# Patient Record
Sex: FEMALE | Race: WHITE | NOT HISPANIC OR LATINO | Employment: FULL TIME | URBAN - METROPOLITAN AREA
[De-identification: names, ages, dates, MRNs, and addresses within clinical notes are randomized per-mention and may not be internally consistent; named-entity substitution may affect disease eponyms.]

---

## 2019-04-17 ENCOUNTER — APPOINTMENT (OUTPATIENT)
Dept: GENERAL RADIOLOGY | Facility: CLINIC | Age: 24
End: 2019-04-17
Attending: EMERGENCY MEDICINE
Payer: COMMERCIAL

## 2019-04-17 ENCOUNTER — HOSPITAL ENCOUNTER (EMERGENCY)
Facility: CLINIC | Age: 24
Discharge: HOME | End: 2019-04-17
Attending: EMERGENCY MEDICINE | Admitting: EMERGENCY MEDICINE
Payer: COMMERCIAL

## 2019-04-17 VITALS
SYSTOLIC BLOOD PRESSURE: 129 MMHG | HEART RATE: 118 BPM | HEIGHT: 63 IN | RESPIRATION RATE: 15 BRPM | BODY MASS INDEX: 40.4 KG/M2 | TEMPERATURE: 99.7 F | WEIGHT: 228 LBS | OXYGEN SATURATION: 99 % | DIASTOLIC BLOOD PRESSURE: 88 MMHG

## 2019-04-17 DIAGNOSIS — R06.02 SHORTNESS OF BREATH: ICD-10-CM

## 2019-04-17 LAB
ALBUMIN UR-MCNC: NEGATIVE MG/DL
ANION GAP SERPL CALCULATED.3IONS-SCNC: 9 MMOL/L (ref 3–14)
APPEARANCE UR: CLEAR
BASOPHILS # BLD AUTO: 0 10E9/L (ref 0–0.2)
BASOPHILS NFR BLD AUTO: 0.3 %
BILIRUB UR QL STRIP: NEGATIVE
BUN SERPL-MCNC: 13 MG/DL (ref 7–30)
CALCIUM SERPL-MCNC: 8.8 MG/DL (ref 8.5–10.1)
CHLORIDE SERPL-SCNC: 108 MMOL/L (ref 94–109)
CO2 SERPL-SCNC: 24 MMOL/L (ref 20–32)
COLOR UR AUTO: NORMAL
CREAT SERPL-MCNC: 0.58 MG/DL (ref 0.52–1.04)
D DIMER PPP FEU-MCNC: 0.4 UG/ML FEU (ref 0–0.5)
DIFFERENTIAL METHOD BLD: NORMAL
EOSINOPHIL # BLD AUTO: 0 10E9/L (ref 0–0.7)
EOSINOPHIL NFR BLD AUTO: 0.3 %
ERYTHROCYTE [DISTWIDTH] IN BLOOD BY AUTOMATED COUNT: 12 % (ref 10–15)
GFR SERPL CREATININE-BSD FRML MDRD: >90 ML/MIN/{1.73_M2}
GLUCOSE SERPL-MCNC: 94 MG/DL (ref 70–99)
GLUCOSE UR STRIP-MCNC: NEGATIVE MG/DL
HCG UR QL: NEGATIVE
HCT VFR BLD AUTO: 41.6 % (ref 35–47)
HGB BLD-MCNC: 14.1 G/DL (ref 11.7–15.7)
HGB UR QL STRIP: NEGATIVE
IMM GRANULOCYTES # BLD: 0 10E9/L (ref 0–0.4)
IMM GRANULOCYTES NFR BLD: 0.1 %
KETONES UR STRIP-MCNC: NEGATIVE MG/DL
LEUKOCYTE ESTERASE UR QL STRIP: NEGATIVE
LYMPHOCYTES # BLD AUTO: 2.8 10E9/L (ref 0.8–5.3)
LYMPHOCYTES NFR BLD AUTO: 31.5 %
MAGNESIUM SERPL-MCNC: 2.1 MG/DL (ref 1.6–2.3)
MCH RBC QN AUTO: 28.7 PG (ref 26.5–33)
MCHC RBC AUTO-ENTMCNC: 33.9 G/DL (ref 31.5–36.5)
MCV RBC AUTO: 85 FL (ref 78–100)
MONOCYTES # BLD AUTO: 0.8 10E9/L (ref 0–1.3)
MONOCYTES NFR BLD AUTO: 8.7 %
NEUTROPHILS # BLD AUTO: 5.2 10E9/L (ref 1.6–8.3)
NEUTROPHILS NFR BLD AUTO: 59.1 %
NITRATE UR QL: NEGATIVE
NRBC # BLD AUTO: 0 10*3/UL
NRBC BLD AUTO-RTO: 0 /100
PH UR STRIP: 7 PH (ref 5–7)
PLATELET # BLD AUTO: 335 10E9/L (ref 150–450)
POTASSIUM SERPL-SCNC: 3.2 MMOL/L (ref 3.4–5.3)
RBC # BLD AUTO: 4.92 10E12/L (ref 3.8–5.2)
SODIUM SERPL-SCNC: 141 MMOL/L (ref 133–144)
SOURCE: NORMAL
SP GR UR STRIP: 1 (ref 1–1.03)
TSH SERPL DL<=0.005 MIU/L-ACNC: 2.7 MU/L (ref 0.4–4)
UROBILINOGEN UR STRIP-MCNC: NORMAL MG/DL (ref 0–2)
WBC # BLD AUTO: 8.8 10E9/L (ref 4–11)

## 2019-04-17 PROCEDURE — 83735 ASSAY OF MAGNESIUM: CPT | Performed by: EMERGENCY MEDICINE

## 2019-04-17 PROCEDURE — 81003 URINALYSIS AUTO W/O SCOPE: CPT | Performed by: EMERGENCY MEDICINE

## 2019-04-17 PROCEDURE — 25000128 H RX IP 250 OP 636: Performed by: EMERGENCY MEDICINE

## 2019-04-17 PROCEDURE — 96361 HYDRATE IV INFUSION ADD-ON: CPT

## 2019-04-17 PROCEDURE — 85025 COMPLETE CBC W/AUTO DIFF WBC: CPT | Performed by: EMERGENCY MEDICINE

## 2019-04-17 PROCEDURE — 84443 ASSAY THYROID STIM HORMONE: CPT | Performed by: EMERGENCY MEDICINE

## 2019-04-17 PROCEDURE — 80048 BASIC METABOLIC PNL TOTAL CA: CPT | Performed by: EMERGENCY MEDICINE

## 2019-04-17 PROCEDURE — 71046 X-RAY EXAM CHEST 2 VIEWS: CPT

## 2019-04-17 PROCEDURE — 96360 HYDRATION IV INFUSION INIT: CPT

## 2019-04-17 PROCEDURE — 93005 ELECTROCARDIOGRAM TRACING: CPT

## 2019-04-17 PROCEDURE — 99285 EMERGENCY DEPT VISIT HI MDM: CPT | Mod: 25

## 2019-04-17 PROCEDURE — 85379 FIBRIN DEGRADATION QUANT: CPT | Performed by: EMERGENCY MEDICINE

## 2019-04-17 PROCEDURE — 81025 URINE PREGNANCY TEST: CPT | Performed by: EMERGENCY MEDICINE

## 2019-04-17 RX ADMIN — SODIUM CHLORIDE 1000 ML: 9 INJECTION, SOLUTION INTRAVENOUS at 13:35

## 2019-04-17 ASSESSMENT — MIFFLIN-ST. JEOR: SCORE: 1753.33

## 2019-04-17 ASSESSMENT — ENCOUNTER SYMPTOMS
SORE THROAT: 1
FEVER: 0
SHORTNESS OF BREATH: 1

## 2019-04-17 NOTE — ED AVS SNAPSHOT
Emergency Department  [address] [place] [address]  Phone:  [phone]  Fax:  [phone]                                    Naomi Morales   MRN: [id number]    Department:   Emergency Department   Date of Visit:  4/17/2019           After Visit Summary Signature Page    I have received my discharge instructions, and my questions have been answered. I have discussed any challenges I see with this plan with the nurse or doctor.    ..........................................................................................................................................  Patient/Patient Representative Signature      ..........................................................................................................................................  Patient Representative Print Name and Relationship to Patient    ..................................................               ................................................  Date                                   Time    ..........................................................................................................................................  Reviewed by Signature/Title    ...................................................              ..............................................  Date                                               Time          22EPIC Rev 08/18

## 2019-04-17 NOTE — ED PROVIDER NOTES
"  History     Chief Complaint:  Shortness of breath    HPI:   The history is provided by the patient.      Naomi Morales is a 23 year old female with history of sinus tachycardia, asthma, and anxiety who presents for evaluation of shortness of breath. The patient was diagnosed with pneumonia 2 weeks ago and started on antibiotics. The patient states that the shortness of breath that she had with this pneumonia had improved over the past 2 weeks but never completely went away. Today, the patient states that she woke up with a \"thick tongue\" sensation and mild sore throat with increased shortness of breath. She states she used her home inhaler, but this did not help much. The patient states that she was concerned for possible allergic reaction, prompting her evaluation. She denies fevers, fainting, or rash. No chest pain or pleuritic symptoms. The patient notes that her calves bilaterally feel sore with some ankle swelling. She has a Nexaplanon implant for birth control. No recent travel, surgery, or history of blood clots. She denies chest pain with inspiration. No rash or hives. No voice changes. No stridor.    Allergies:  No known drug allergies      Medications:    Flonase  Nexaplanon   Buspar  Cymbalta  Fluconazole     Past Medical History:    Anxiety  Depressive disorder  Fibromyalgia   Mild intermittent asthma   Vitamin D deficiency  Sinus tachycardia     Past Surgical History:    ACL reconstruction     Family History:    Cataract: father  Thyroid disorder: mother    Social History:  PCP: [facility]    Marital Status:  Single  Smoking status: never  Alcohol use: yes, 1x per month      Review of Systems   Constitutional: Negative for fever.   HENT: Positive for sore throat.    Respiratory: Positive for shortness of breath.    Cardiovascular: Positive for leg swelling. Negative for chest pain.   Skin: Negative for rash.   Neurological: Negative for syncope.   All other systems reviewed and " "are negative.    Physical Exam     Patient Vitals for the past 24 hrs:   BP Temp Temp src Pulse Heart Rate Resp SpO2 Height Weight   04/17/19 1652 129/88 -- -- 118 -- -- -- -- --   04/17/19 1651 -- -- -- -- -- -- 99 % -- --   04/17/19 1500 (!) 143/94 -- -- 112 112 15 99 % -- --   04/17/19 1430 122/86 -- -- 111 105 19 100 % -- --   04/17/19 1400 141/83 -- -- 116 117 19 98 % -- --   04/17/19 1330 -- -- -- -- 141 13 100 % -- --   04/17/19 1313 156/90 99.7  F (37.6  C) Tympanic 152 152 20 100 % 1.6 m (5' 3\") 103.4 kg (228 lb)        Physical Exam  General: Well appearing, nontoxic.  Resting comfortably. Talking on cell phone.  Head:  Scalp, face, and head appear normal  Eyes:  Pupils are equal, round, and reactive to light    Conjunctivae non-injected and sclerae white  ENT:    The external nose is normal    Pinnae are normal    The oropharynx is normal, mucous membranes moist    Posterior pharynx clear without swelling, exudates or erythema. No mucosal lesions, tongue or lip swelling. No tongue elevation. Uvula normal without deviation. Voice normal. No drooling or trismus.     Uvula is in the midline  Neck:  Normal range of motion    There is no rigidity noted    Trachea is in the midline  CV:  Tachycardic rate, regular rhythm     Normal S1/S2, no S3/S4    No murmur or rub. Radial pulses 2+ bilaterally   Resp:  Lungs are clear and equal bilaterally    There is no tachypnea    No increased work of breathing    No rales, wheezing, or rhonchi  GI:  Abdomen is soft, no rigidity or guarding    No distension, or mass    No tenderness or rebound tenderness   MS:  Normal muscular tone    Symmetric motor strength    No lower extremity edema. No calf swelling or tenderness.  Skin:  No rash or acute skin lesions noted  Neuro: Awake and alert. Oriented x3    CN II-XII intact. Strength 5/5 and symmetric.    Speech is normal and fluent    Moves all extremities spontaneously  Psych:  Normal affect.  Appropriate " interactions.      Emergency Department Course     ECG (13:32:40):  Rate 135 bpm. OH interval 138. QRS duration 76. QT/QTc 300/450. P-R-T axes 60 64 37.   Sinus tachycardia   Right atrial enlargement  Borderline ECG  Interpreted at 1340 by Silver Simpson MD.     Imaging:  Radiographic findings were communicated with the patient who voiced understanding of the findings.    XR Chest 2 Views  Impression: No acute disease.  As read by Radiology.     Laboratory:  UA reflex to microscopic and culture: WNL  HCG qualitative urine: Negative  CBC: WNL (WBC 8.8, HGB 14.1, )   BMP: potassium 3.2, o/w WNL (Creatinine 0.58)  D dimer: 0.4  Magnesium: 2.1  TSH with free T4 reflex: 2.70    Interventions:  1335: NS 1L IV Bolus      Emergency Department Course:  Past medical records, nursing notes, and vitals reviewed.  1340: I performed an exam of the patient and obtained history, as documented above.  IV started and blood drawn for laboratory testing. Results are as above.    The patient was sent for X-ray imaging while in the emergency department, findings above.       1600: I rechecked the patient. Explained findings to the patient.     I rechecked the patient. Findings and plan explained to the Patient. Patient discharged home with instructions regarding supportive care, medications, and reasons to return. The importance of close follow-up was reviewed.      Impression & Plan      Medical Decision Making:  Naomi Morales is a 23 year old female who presents with multiple complaints, but chiefly she is concerned she may have an allergic reaction to something. She was recently treated for pneumonia with an unknown antibiotic, which is now finished. She has not had any recurrent or persistent fevers or cough. She notes some mild ongoing shortness of breath but no chest pain and no pleuritic pain. She also notes swelling to the bilateral lower extremities and calf soreness. On my evaluation, the patient is well-appearing.  She is tachycardic but afebrile. No hypoxia. Her blood pressure is reassuring. EKG was obtained and reveals sinus tachycardia. The patinet appears clinically mildly dehydrated. A broad differential diagnosis is considered including dysrhythmia, PE, DVT, dehydration, underlying metabolic disturbance or infection. Lung exam is clear. She has no increased work of breathing or respiratory distress. ED workup was reassuring. Her signs and symptoms are not consistent with allergic reaction. Chest X-ray was negative for any pneumonia, pleural effusions, or other acute thoracic findings. ECG was negative. BMP and CBC are completely unremarkable. TSH is normal. Urinalysis is negative for infection. D dimer is normal which makes PE or DVT very unlikely. The patient was treated with IV fluids and her tachycardia improved. She also admits to being very anxious, which likely contributed to her tachycardia. The patient felt significantly improved after IV fluids. I recommended ongoing close outpatient follow up. At this time, there does not appear to be any severe acute life threatening or underlying illness. Close return precautions were provided and she was discharged in stable and improved condition.     Critical Care time:  none    Diagnosis:    ICD-10-CM    1. Shortness of breath R06.02        Disposition:  discharged to home    Discharge Medications: There are no discharge medications for this visit.    I, Megan Beh, am serving as a scribe at 1:40 PM on 4/17/2019 to document services personally performed by Silver Simpson MD based on my observations and the provider's statements to me.      Megan Beh  4/17/2019    EMERGENCY DEPARTMENT       Silver Simpson MD  04/17/19 [id number]

## 2019-04-18 LAB — INTERPRETATION ECG - MUSE: NORMAL

## 2019-04-28 ENCOUNTER — APPOINTMENT (OUTPATIENT)
Dept: GENERAL RADIOLOGY | Facility: CLINIC | Age: 24
End: 2019-04-28
Attending: PHYSICIAN ASSISTANT
Payer: OTHER MISCELLANEOUS

## 2019-04-28 ENCOUNTER — HOSPITAL ENCOUNTER (EMERGENCY)
Facility: CLINIC | Age: 24
Discharge: HOME | End: 2019-04-28
Attending: PHYSICIAN ASSISTANT | Admitting: PHYSICIAN ASSISTANT
Payer: OTHER MISCELLANEOUS

## 2019-04-28 VITALS
DIASTOLIC BLOOD PRESSURE: 94 MMHG | TEMPERATURE: 98.1 F | WEIGHT: 230 LBS | OXYGEN SATURATION: 100 % | BODY MASS INDEX: 40.75 KG/M2 | RESPIRATION RATE: 16 BRPM | HEIGHT: 63 IN | HEART RATE: 110 BPM | SYSTOLIC BLOOD PRESSURE: 151 MMHG

## 2019-04-28 DIAGNOSIS — S82.832A CLOSED FRACTURE OF DISTAL END OF LEFT FIBULA, UNSPECIFIED FRACTURE MORPHOLOGY, INITIAL ENCOUNTER: ICD-10-CM

## 2019-04-28 PROCEDURE — 27786 TREATMENT OF ANKLE FRACTURE: CPT | Mod: LT

## 2019-04-28 PROCEDURE — 99284 EMERGENCY DEPT VISIT MOD MDM: CPT | Mod: 25

## 2019-04-28 PROCEDURE — 73610 X-RAY EXAM OF ANKLE: CPT | Mod: LT

## 2019-04-28 ASSESSMENT — ENCOUNTER SYMPTOMS
JOINT SWELLING: 1
HEADACHES: 0
MYALGIAS: 1
ARTHRALGIAS: 1

## 2019-04-28 ASSESSMENT — MIFFLIN-ST. JEOR: SCORE: 1762.4

## 2019-04-28 NOTE — LETTER
April 28, 2019      To Whom It May Concern:      Naomi Morales was seen in our Emergency Department today, 04/28/19. Please limit weight bearing activities until orthopedic follow up. I expect her condition to improve over the next 5-7 days.        Sincerely,        Sindi Yuen PA-C

## 2019-04-28 NOTE — ED PROVIDER NOTES
"  History     Chief Complaint:  Ankle pain    HPI   Naomi Morales is a 23 year old female, otherwise healthy, who presents to the emergency department for evaluation of left ankle pain. 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 She reports hearing a \"crack\". She reports pain to her lateral ankle. She reports she was unable to get up on her own and has been unable to bare weight on her ankle. She denies hitting her head or any loss of consciousness. She denies any other injuries in the fall.  She denies weakness or numbness.  She did note some tingling to the big toe, but this is now resolved.    Allergies:  No known drug allergies     Medications:    Nexplanon  Buspar  Duloxetine  Zantac  Vitamin D  Flonase  CBD oil    Past Medical History:    Anxiety  Depression  Varicella    Past Surgical History:    ACL reconstruction  I & D    Family History:    Diabetes    Social History:  Smoking status: Passive smoke exposure, never smoker.  Alcohol use: No  The patient presents to the emergency department with her boyfriend.  Marital Status:  Single [1]     Review of Systems   Musculoskeletal: Positive for arthralgias, joint swelling and myalgias.   Neurological: Negative for syncope and headaches.   All other systems reviewed and are negative.    Physical Exam   Patient Vitals for the past 24 hrs:   BP Temp Temp src Pulse Resp SpO2 Height Weight   04/28/19 1548 (!) 151/94 98.1  F (36.7  C) Oral 110 16 100 % 1.6 m (5' 3\") 104.3 kg (230 lb)     Physical Exam  General: Resting comfortably. Alert and oriented.   Head:  The scalp, face, and head appear normal   Eyes:  Conjunctivae and sclerae are normal    CV:  DP and PT pulses intact to left foot    Capillary refill is brisk in all digits of the left foot.   Resp:  No tachypnea. No respiratory distress    Normal effort  MS:  Tenderness to left lateral ankle. ROM of the ankle is full, but painful. Patient can " wiggle toes without difficulty. No proximal fibular tenderness. Knee ROM is normal. Achilles is clinically intact.  Skin:  No ecchymosis. No obvious deformity. Swelling to the lateral aspect of the left ankle. No lacerations or abrasions.  Neuro: Sensation is intact throughout left foot.     Emergency Department Course   Imaging:  Radiographic findings were communicated with the patient and family who voiced understanding of the findings.    XR Ankle Left G/E 3 Views  Addendum: There is a subtle nondisplaced fracture through the distal  fibula.   As read by Radiology.    Emergency Department Course:  Past medical records, nursing notes, and vitals reviewed.  1559: I performed an exam of the patient and obtained history, as documented above.    The patient was sent for an ankle x-ray while in the emergency department, findings above.     1700: I rechecked the patient. Explained findings to the patient and her boyfriend.    1709: I discussed the patient with my partner, Dr. Larkin.    Patient was provided a CAM boot and crutches.    1724: I rechecked the patient. Findings and plan explained to the Patient and significant other. Patient discharged home with instructions regarding supportive care, medications, and reasons to return. The importance of close follow-up was reviewed.   Impression & Plan    Medical Decision Making:  Naomi Morales is a 23 year old female who presents for evaluation of a left ankle injury. CMS is intact distally in the extremity.  Pulses are normal and there are no signs of serious sequelae including compartment syndrome of the leg or foot. They did not hit their head and did not sustain any injury to suggest need for further workup at this time.     X-rays reveals a distal fibular fracture that does not need reduction at this time. They understand that this need for reduction and/or surgery may change with time and orthopedic consultation. There is no indication for ortho consultation  from the ED. Rather, close follow-up is indicated in the next days. They will be discharged home. A CAM boot was placed and the patient was given crutches. They will assisted weight bearing. Fracture precautions were given for home. They were asked to follow up with orthopedics in 2-3 days for recheck. They were asked to return immediately for increased pain, weakness, numbness, change in color or the extremity, fevers, or any other concerns. All questions were answered prior to discharge. The patient understands and agrees to this plan.        Diagnosis:    ICD-10-CM   1. Closed fracture of distal end of left fibula, unspecified fracture morphology, initial encounter S82.832A     Disposition:  Discharged to home with instructions for follow up.  Andreea Delgado  4/28/2019    EMERGENCY DEPARTMENT  Scribe Disclosure:  I, Andreea Delgado, am serving as a scribe at 3:59 PM on 4/28/2019 to document services personally performed by Sindi Yuen PA-C based on my observations and the provider's statements to me.      Sindi Yuen PA-C  04/28/19 9650

## 2019-04-28 NOTE — ED NOTES
"Emergency Department Attending Supervision Note  4/28/2019  5:40 PM      I evaluated this patient in conjunction with Sindi Yuen PA       Briefly, the patient presented with left ankle pain s/p mechanical fall. 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 She reports hearing a \"crack\". She reports pain to her lateral ankle. She reports she was unable to get up on her own and has been unable to bare weight on her ankle. She denies hitting her head or any loss of consciousness. She denies any other injuries in the fall.  She denies weakness or numbness.  She did note some tingling to the big toe, but this is now resolved.    On my exam:  BP (!) 151/94   Pulse 110   Temp 98.1  F (36.7  C) (Oral)   Resp 16   Ht 1.6 m (5' 3\")   Wt 104.3 kg (230 lb)   SpO2 100%   BMI 40.74 kg/m        General: Patient in mild to moderate distress.  Alert and cooperative with exam. Normal mentation  HEENT: NC/AT. Conjunctiva without injection or scleral icterus. External ears normal.  Respiratory: Breathing comfortably on room air  CV: Normal rate, all extremities well perfused  GI:  Non-distended abdomen  Skin: Warm, dry, no rashes/open wounds on exposed skin  Musculoskeletal: LLE: CMS intact. Swelling and tenderness to palpation of the distal fibula. No pain to palpation at the knees; knee and hip exam are normal. No tenderness to pation of mid foot.   Neuro: Alert, answers questions appropriately. No gross motor deficits      Results:  XR Ankle Left G/E 3 Views   Final Result   Addendum 1 of 1   ASTRID ESPINOZA   Accession # [id number]      The original report on this patient was dictated by myself.        Addendum: There is a subtle nondisplaced fracture through the distal   fibula.      HENOK OSCAR MD (Date of Addendum: 4/28/2019 4:49 PM)      HENOK OSCAR MD      Final          My impression is closed and nondisplaced fracture through the distal fibula " from mechanical fall.  No other significant injuries identified.  See imaging findings above.  Patient was provided walking boot and crutches.  Recommended Tylenol/ibuprofen for pain control as well as icing.  Recommended close follow-up with [facility] orthopedics.  Return precautions discussed.  Patient discharged home.    Diagnosis    ICD-10-CM    1. Closed fracture of distal end of left fibula, unspecified fracture morphology, initial encounter S82.832A        Rito Larkin DO,       Rito Larkin DO  04/29/19 0953

## 2019-04-28 NOTE — ED AVS SNAPSHOT
Emergency Department  [address] [place] [address]  Phone:  [phone]  Fax:  [phone]                                    Naomi Morales   MRN: [id number]    Department:   Emergency Department   Date of Visit:  4/28/2019           After Visit Summary Signature Page    I have received my discharge instructions, and my questions have been answered. I have discussed any challenges I see with this plan with the nurse or doctor.    ..........................................................................................................................................  Patient/Patient Representative Signature      ..........................................................................................................................................  Patient Representative Print Name and Relationship to Patient    ..................................................               ................................................  Date                                   Time    ..........................................................................................................................................  Reviewed by Signature/Title    ...................................................              ..............................................  Date                                               Time          22EPIC Rev 08/18

## 2019-12-01 ENCOUNTER — OFFICE VISIT (OUTPATIENT)
Dept: URGENT CARE | Facility: URGENT CARE | Age: 24
End: 2019-12-01
Payer: COMMERCIAL

## 2019-12-01 VITALS
OXYGEN SATURATION: 97 % | HEART RATE: 107 BPM | DIASTOLIC BLOOD PRESSURE: 96 MMHG | BODY MASS INDEX: 37.21 KG/M2 | WEIGHT: 210 LBS | HEIGHT: 63 IN | TEMPERATURE: 98.1 F | SYSTOLIC BLOOD PRESSURE: 146 MMHG

## 2019-12-01 DIAGNOSIS — S61.411A LACERATION OF RIGHT PALM, INITIAL ENCOUNTER: Primary | ICD-10-CM

## 2019-12-01 PROCEDURE — 12001 RPR S/N/AX/GEN/TRNK 2.5CM/<: CPT | Performed by: PHYSICIAN ASSISTANT

## 2019-12-01 RX ORDER — CHOLECALCIFEROL (VITAMIN D3) 50 MCG
1 TABLET ORAL DAILY
COMMUNITY
End: 2021-12-31

## 2019-12-01 RX ORDER — DULOXETIN HYDROCHLORIDE 30 MG/1
30 CAPSULE, DELAYED RELEASE ORAL 2 TIMES DAILY
COMMUNITY
End: 2021-12-31

## 2019-12-01 RX ORDER — BUSPIRONE HYDROCHLORIDE 10 MG/1
10 TABLET ORAL DAILY
Status: ON HOLD | COMMUNITY
End: 2022-07-09

## 2019-12-01 ASSESSMENT — MIFFLIN-ST. JEOR: SCORE: 1662.71
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
## 2019-12-11 ENCOUNTER — NURSE TRIAGE (OUTPATIENT)
Dept: NURSING | Facility: CLINIC | Age: 24
End: 2019-12-11

## 2019-12-11 NOTE — TELEPHONE ENCOUNTER
FNA triage call :   Presenting problem :  Had  2 stitichs  In R palm on 12/1/19 , and need stitches removed after 10 days and healing well and no fever . Requesting appt only for stitches removed and sent to .    Caller verbalizes understanding and denies further questions and will call back if further symptoms to triage or questions  . Clarita Means RN  - [place] Nurse Advisor

## 2020-02-08 ENCOUNTER — HEALTH MAINTENANCE LETTER (OUTPATIENT)
Age: 25
End: 2020-02-08

## 2020-11-07 ENCOUNTER — HEALTH MAINTENANCE LETTER (OUTPATIENT)
Age: 25
End: 2020-11-07

## 2021-03-27 ENCOUNTER — HEALTH MAINTENANCE LETTER (OUTPATIENT)
Age: 26
End: 2021-03-27

## 2021-06-04 ENCOUNTER — OFFICE VISIT (OUTPATIENT)
Dept: URGENT CARE | Facility: URGENT CARE | Age: 26
End: 2021-06-04
Payer: COMMERCIAL

## 2021-06-04 VITALS
WEIGHT: 240 LBS | SYSTOLIC BLOOD PRESSURE: 128 MMHG | DIASTOLIC BLOOD PRESSURE: 98 MMHG | BODY MASS INDEX: 42.52 KG/M2 | OXYGEN SATURATION: 100 % | HEART RATE: 104 BPM | TEMPERATURE: 99.9 F | HEIGHT: 63 IN

## 2021-06-04 DIAGNOSIS — J01.00 ACUTE NON-RECURRENT MAXILLARY SINUSITIS: Primary | ICD-10-CM

## 2021-06-04 PROCEDURE — 99213 OFFICE O/P EST LOW 20 MIN: CPT | Performed by: FAMILY MEDICINE

## 2021-06-04 RX ORDER — CALCIUM CARBONATE 750 MG/1
750 TABLET, CHEWABLE ORAL DAILY
COMMUNITY
End: 2021-12-31

## 2021-06-04 RX ORDER — FOLIC ACID 1 MG/1
1 TABLET ORAL DAILY
COMMUNITY
End: 2021-12-31

## 2021-06-04 RX ORDER — FLUCONAZOLE 150 MG/1
150 TABLET ORAL
Qty: 2 TABLET | Refills: 0 | Status: SHIPPED | OUTPATIENT
Start: 2021-06-04 | End: 2021-12-31

## 2021-06-04 RX ORDER — ALBUTEROL SULFATE 90 UG/1
2 AEROSOL, METERED RESPIRATORY (INHALATION) EVERY 6 HOURS
COMMUNITY

## 2021-06-04 RX ORDER — MULTIVITAMIN WITH IRON
1 TABLET ORAL DAILY
COMMUNITY
End: 2021-12-31

## 2021-06-04 RX ORDER — FLUCONAZOLE 150 MG/1
150 TABLET ORAL ONCE
Qty: 1 TABLET | Refills: 0 | Status: SHIPPED | OUTPATIENT
Start: 2021-06-04 | End: 2021-06-04

## 2021-06-04 ASSESSMENT — MIFFLIN-ST. JEOR: SCORE: 1792.76

## 2021-06-05 NOTE — PROGRESS NOTES
Assessment & Plan     Acute non-recurrent maxillary sinusitis: >10d of sinus symptoms, acutely worsening in the last 3 days. No fever. Will treat with augmentin. Discussed concerning symptoms for which to return to urgent care or the ED.   - amoxicillin-clavulanate (AUGMENTIN) 875-125 MG tablet  Dispense: 20 tablet; Refill: 0  - fluconazole (DIFLUCAN) 150 MG tablet  Dispense: 2 tablet; Refill: 0     15 minutes spent on the date of the encounter doing patient visit and documentation       Return in about 5 days (around 6/9/2021) for with PCP if your symptoms are not improving, sooner if worsening.    Francoise Hughes MD  [facility] [place]    Gris Salgado is a 26 year old female who presents to clinic today for the following health issues:  Chief Complaint   Patient presents with     Urgent Care     Sinus Problem     1 1/2 weeks ago started coughing up phlegm, ears have been hurting, has had runny nose, and has been feeling fatigued.  Today has severe pain in left upper teeth, thinks she has sinus infection.  Hx of asthma around cigarette smoke, was in [place] recently and using inhaler a lot due to smoke, still having cough and dry throat at night.     Sinus Problem     Fully vaccinated for  COVID since January.     HPI      URI Adult    Onset of symptoms was 10 day(s) ago.  Course of illness is worsening.    Severity severe  Current and Associated symptoms: runny nose, stuffy nose, ear pain bilateral, sore throat, facial pain/pressure, headache and fatigue.   Had cough and more rhinorrhea earlier in the course.   The facial pain and now dental pain have become much more severe in the last 3 days.   Treatment measures tried include Tylenol/Ibuprofen and Decongestants.  Predisposing factors include tobacco use and HX of asthma.    No sick contacts, though she was just in [place]. Vaccinated against Covid.     Does have a history of intermittent sinus infections in the past -  "this is typical.     Review of Systems  No fever, myalgias, chest pain or shortness of breath.       Objective    BP (!) 128/98   Pulse 104   Temp 99.9  F (37.7  C) (Oral)   Ht 1.6 m (5' 3\")   Wt 108.9 kg (240 lb)   SpO2 100%   BMI 42.51 kg/m    Physical Exam   GENERAL: healthy, alert and no distress  HEENT: Head - normocephalic, atraumatic, substantial pain on palpation of the maxillary sinuses bilaterally   Eyes - normal lids and conjuntivae, EOMs intact   Nose - no deformity, thick yellow rhinorrhea  Oropharynx - Oral mucosa and pharnyx normal, moist mucous membranes   Ears: TMs bulging with clear fluid bilaterally, normal canals.   NECK: no adenopathy, no asymmetry  RESP: lungs clear to auscultation - no rales, rhonchi or wheezes  CV: regular rate and rhythm, normal S1 S2, no S3 or S4, no murmur, click or rub, no peripheral edema and peripheral pulses strong                "

## 2021-06-05 NOTE — PATIENT INSTRUCTIONS
Patient Education     Sinusitis (Antibiotic Treatment)    The sinuses are air-filled spaces within the bones of the face. They connect to the inside of the nose. Sinusitis is an inflammation of the tissue that lines the sinuses. Sinusitis can occur during a cold. It can also happen due to allergies to pollens and other particles in the air. Sinusitis can cause symptoms of sinus congestion and a feeling of fullness. A sinus infection causes fever, headache, and facial pain. There is often green or yellow fluid draining from the nose or into the back of the throat (post-nasal drip). You have been given antibiotics to treat this condition.   Home care    Take the full course of antibiotics as instructed. Don't stop taking them, even when you feel better.    Drink plenty of water, hot tea, and other liquids as directed by the healthcare provider. This may help thin nasal mucus. It also may help your sinuses drain fluids.    Heat may help soothe painful areas of your face. Use a towel soaked in hot water. Or,  the shower and direct the warm spray onto your face. Using a vaporizer along with a menthol rub at night may also help soothe symptoms.     An expectorant with guaifenesin may help thin nasal mucus and help your sinuses drain fluids. Talk with your provider or pharmacists before taking an over-the-counter (OTC) medicine if you have any questions about it or its side effects..    You can use an OTC decongestant, unless a similar medicine was prescribed to you. Nasal sprays work the fastest. Use one that contains phenylephrine or oxymetazoline. First blow your nose gently. Then use the spray. Don't use these medicines more often than directed on the label. If you do, your symptoms may get worse. You may also take pills that contain pseudoephedrine. Don t use products that combine multiple medicines. This is because side effects may be increased. Read labels. You can also ask the pharmacist for help. (People  with high blood pressure should not use decongestants. They can raise blood pressure.) Talk with your provider or pharmacist if you have any questions about the medicine..    OTC antihistamines may help if allergies contributed to your sinusitis. Talk with your provider or pharmacist if you have any questions about the medicine..    Don't use nasal rinses or irrigation during an acute sinus infection, unless your healthcare provider tells you to. Rinsing may spread the infection to other areas in your sinuses.    Use acetaminophen or ibuprofen to control pain, unless another pain medicine was prescribed to you. If you have chronic liver or kidney disease or ever had a stomach ulcer, talk with your healthcare provider before using these medicines. Never give aspirin to anyone under age 18 who is ill with a fever. It may cause severe liver damage.    Don't smoke. This can make symptoms worse.    Follow-up care  Follow up with your healthcare provider, or as advised.   When to seek medical advice  Call your healthcare provider if any of these occur:     Facial pain or headache that gets worse    Stiff neck    Unusual drowsiness or confusion    Swelling of your forehead or eyelids    Symptoms don't go away in 10 days    Vision problems, such as blurred or double vision    Fever of 100.4 F (38 C) or higher, or as directed by your healthcare provider  Call 911  Call 911 if any of these occur:     Seizure    Trouble breathing    Feeling dizzy or faint    Fingernails, skin or lips look blue, purple , or gray  Prevention  Here are steps you can take to help prevent an infection:     Keep good hand washing habits.    Don t have close contact with people who have sore throats, colds, or other upper respiratory infections.    Don t smoke, and stay away from secondhand smoke.    Stay up to date with of your vaccines.  [employer] last reviewed this educational content on 12/1/2019 2000-2021 [employer]. All rights  reserved. This information is not intended as a substitute for professional medical care. Always follow your healthcare professional's instructions.

## 2021-09-05 ENCOUNTER — HEALTH MAINTENANCE LETTER (OUTPATIENT)
Age: 26
End: 2021-09-05

## 2021-09-07 ENCOUNTER — LAB (OUTPATIENT)
Dept: LAB | Facility: CLINIC | Age: 26
End: 2021-09-07
Payer: COMMERCIAL

## 2021-09-07 ENCOUNTER — MEDICAL CORRESPONDENCE (OUTPATIENT)
Dept: HEALTH INFORMATION MANAGEMENT | Facility: CLINIC | Age: 26
End: 2021-09-07

## 2021-09-07 DIAGNOSIS — Z31.430 ENCOUNTER OF FEMALE FOR TESTING FOR GENETIC DISEASE CARRIER STATUS FOR PROCREATIVE MANAGEMENT: Primary | ICD-10-CM

## 2021-09-07 DIAGNOSIS — Z31.430 ENCOUNTER OF FEMALE FOR TESTING FOR GENETIC DISEASE CARRIER STATUS FOR PROCREATIVE MANAGEMENT: ICD-10-CM

## 2021-09-07 PROCEDURE — 36415 COLL VENOUS BLD VENIPUNCTURE: CPT

## 2021-09-08 NOTE — PROGRESS NOTES
9/8/2021     Naomi Morales accompanied her fiance Jaime Dunn to his appointment today in maternal fetal medicine.  Please see corresponding documentation for full details.  In order to clarify risks for future pregnancies for the couple to be affected with a broad spectrum of recessive/x-linked genetic disorders, the couple opted to proceed with expanded carrier screening through [facility]. We discussed that expanded carrier screening is designed to identify carrier status for conditions that are primarily childhood or adolescent onset. Expanded carrier screening does not evaluate for adult-onset conditions such as hereditary cancer syndromes, dementia/ Alzheimer's disease, or cardiovascular disease risk factors. Additionally, expanded carrier screening is not comprehensive for all known genetic diseases or inherited conditions. This is a screening test, and residual carrier status risk figures will be provided to the patient after results become available. Carrier screening is not meant to diagnose the patient with a condition, and generally carriers are asymptomatic. However, certain genes may confer increased risks for various health concerns in carriers (for example, but not limited to: GBA, FMR1).  Appropriate consent was obtained for testing, and Naomi will be contacted to discuss results when available.  Naomi completed a consent to communicate with Crow regarding her results.      Rito Galvan MS, [facility]  Licensed Genetic Counselor  Phone: [phone]  Pager: [phone]

## 2021-09-17 ENCOUNTER — TELEPHONE (OUTPATIENT)
Dept: MATERNAL FETAL MEDICINE | Facility: CLINIC | Age: 26
End: 2021-09-17

## 2021-09-17 LAB
Lab: NORMAL
PERFORMING LABORATORY: NORMAL
SPECIMEN STATUS: NORMAL
TEST NAME: NORMAL

## 2021-09-17 NOTE — TELEPHONE ENCOUNTER
9/17/2021    Called Naomi to discuss carrier screening results. Naomi's results are negative for all 289 conditions assessed, putting any future pregnancy at very low probability for being affected.  Naomi's fiance Crow was tested simultaneously, and was found to be a carrier for Juan A disease.  However, Naomi's negative results for this condition mean any future pregnancy for the couple is at low reproductive risk.  Crow completed a consent to communicate with Naomi regarding his results.  Naomi had no questions at this time and was encouraged to remain in contact with genetic counseling as needed moving forward.     Rito Galvan MS, [facility]  Licensed Genetic Counselor  Phone: [phone]  Pager: [phone]

## 2021-12-31 ENCOUNTER — PRENATAL OFFICE VISIT (OUTPATIENT)
Dept: NURSING | Facility: CLINIC | Age: 26
End: 2021-12-31
Payer: COMMERCIAL

## 2021-12-31 VITALS — WEIGHT: 240 LBS | HEIGHT: 63 IN | BODY MASS INDEX: 42.52 KG/M2

## 2021-12-31 DIAGNOSIS — Z13.79 GENETIC SCREENING: ICD-10-CM

## 2021-12-31 DIAGNOSIS — O36.80X0 PREGNANCY WITH INCONCLUSIVE FETAL VIABILITY: Primary | ICD-10-CM

## 2021-12-31 DIAGNOSIS — O09.91 SUPERVISION OF HIGH RISK PREGNANCY IN FIRST TRIMESTER: ICD-10-CM

## 2021-12-31 PROCEDURE — 99207 PR NO CHARGE NURSE ONLY: CPT

## 2021-12-31 RX ORDER — FOLIC ACID 1 MG/1
1 TABLET ORAL DAILY
COMMUNITY
End: 2022-04-15

## 2021-12-31 ASSESSMENT — MIFFLIN-ST. JEOR: SCORE: 1792.76

## 2021-12-31 NOTE — PROGRESS NOTES
SUBJECTIVE:     HPI:    This is a 26 year old female patient,  who presents for her first obstetrical visit.    ANDREINA: 2022, by Last Menstrual Period.  She is 10w2d weeks.  Her cycles are regular.  Her last menstrual period was normal.   Since her LMP, she has experienced  nausea.    Additional History: first pregnancy    Have you travelled during the pregnancy?No  Have your sexual partner(s) travelled during the pregnancy?No    HISTORY:   Planned Pregnancy: Yes  Marital Status:   Occupation: works for non profit  Living in Household: Spouse-Crow    Past History:  Her past medical history   Past Medical History:   Diagnosis Date     Anxiety      Asthma      Depressive disorder      Fibromyalgia      PTSD (post-traumatic stress disorder)      Vitamin D deficiency    .      She has a history of  first pregnancy    Since her last LMP she denies use of alcohol, tobacco and street drugs.    Past medical, surgical, social and family history were reviewed and updated in [facility].      Current Outpatient Medications   Medication     albuterol (PROAIR HFA/PROVENTIL HFA/VENTOLIN HFA) 108 (90 Base) MCG/ACT inhaler     amoxicillin-clavulanate (AUGMENTIN) 875-125 MG tablet     busPIRone (BUSPAR) 10 MG tablet     calcium carbonate 750 MG CHEW     DULoxetine (CYMBALTA) 30 MG capsule     etonogestrel (IMPLANON/NEXPLANON) 68 MG IMPL     fluconazole (DIFLUCAN) 150 MG tablet     folic acid (FOLVITE) 1 MG tablet     HEMP OIL OR EXTRACT OR OTHER CBD CANNABINOID, NOT MEDICAL CANNABIS,     vitamin (B COMPLEX-C) tablet     vitamin D3 (CHOLECALCIFEROL) 2000 units (50 mcg) tablet     No current facility-administered medications for this visit.       ROS:   12 point review of systems negative other than symptoms noted below or in the HPI.  Gastrointestinal: Nausea  OBJECTIVE:     Nurse phone visit completed. Prenatal book and folder containing standard educational hand-outs and brochures will be given at the next visit to  "patient. Information in folder reviewed over the phone and sent via [employer] Questions answered. Information given on optional screening available to assess chromosomal anomalies. Pt desires NIPS. Pt advised to call the clinic if she has any questions or concerns related to her pregnancy. Prenatal labs future ordered.   Suzy Means RN on 12/31/2021 at 2:00 PM        No results found for: PAP  PHQ-9 score:    PHQ 12/31/2021   PHQ-9 Total Score 4   Q9: Thoughts of better off dead/self-harm past 2 weeks Not at all               MICHAEL-7 SCORE 12/31/2021   Total Score 8 (mild anxiety)   Total Score 8             Patient supplied answers from flow sheet for:  Prenatal OB Questionnaire.                                EXAM:  Ht 1.6 m (5' 3\")   Wt 108.9 kg (240 lb)   LMP 10/20/2021   BMI 42.51 kg/m   Body mass index is 42.51 kg/m .      "

## 2022-01-12 ENCOUNTER — PRENATAL OFFICE VISIT (OUTPATIENT)
Dept: OBGYN | Facility: CLINIC | Age: 27
End: 2022-01-12
Payer: COMMERCIAL

## 2022-01-12 ENCOUNTER — ANCILLARY PROCEDURE (OUTPATIENT)
Dept: ULTRASOUND IMAGING | Facility: CLINIC | Age: 27
End: 2022-01-12
Payer: COMMERCIAL

## 2022-01-12 VITALS — WEIGHT: 232 LBS | DIASTOLIC BLOOD PRESSURE: 72 MMHG | BODY MASS INDEX: 41.1 KG/M2 | SYSTOLIC BLOOD PRESSURE: 110 MMHG

## 2022-01-12 DIAGNOSIS — Z13.79 GENETIC SCREENING: ICD-10-CM

## 2022-01-12 DIAGNOSIS — O36.80X0 PREGNANCY WITH INCONCLUSIVE FETAL VIABILITY: ICD-10-CM

## 2022-01-12 DIAGNOSIS — O09.91 SUPERVISION OF HIGH RISK PREGNANCY IN FIRST TRIMESTER: ICD-10-CM

## 2022-01-12 DIAGNOSIS — Z34.01 ENCOUNTER FOR SUPERVISION OF NORMAL FIRST PREGNANCY IN FIRST TRIMESTER: Primary | ICD-10-CM

## 2022-01-12 DIAGNOSIS — Z80.8 FAMILY HISTORY OF RETINOBLASTOMA: ICD-10-CM

## 2022-01-12 LAB
ABO/RH(D): NORMAL
ALBUMIN UR-MCNC: NEGATIVE MG/DL
ANTIBODY SCREEN: NEGATIVE
APPEARANCE UR: CLEAR
BILIRUB UR QL STRIP: NEGATIVE
COLOR UR AUTO: YELLOW
DEPRECATED CALCIDIOL+CALCIFEROL SERPL-MC: 42 UG/L (ref 20–75)
ERYTHROCYTE [DISTWIDTH] IN BLOOD BY AUTOMATED COUNT: 12 % (ref 10–15)
GLUCOSE UR STRIP-MCNC: NEGATIVE MG/DL
HBV SURFACE AG SERPL QL IA: NONREACTIVE
HCT VFR BLD AUTO: 41.8 % (ref 35–47)
HCV AB SERPL QL IA: NONREACTIVE
HGB BLD-MCNC: 13.7 G/DL (ref 11.7–15.7)
HGB UR QL STRIP: NEGATIVE
HIV 1+2 AB+HIV1 P24 AG SERPL QL IA: NONREACTIVE
KETONES UR STRIP-MCNC: NEGATIVE MG/DL
LEUKOCYTE ESTERASE UR QL STRIP: NEGATIVE
MCH RBC QN AUTO: 28 PG (ref 26.5–33)
MCHC RBC AUTO-ENTMCNC: 32.8 G/DL (ref 31.5–36.5)
MCV RBC AUTO: 85 FL (ref 78–100)
NITRATE UR QL: NEGATIVE
PH UR STRIP: 7 [PH] (ref 5–7)
PLATELET # BLD AUTO: 324 10E3/UL (ref 150–450)
RBC # BLD AUTO: 4.9 10E6/UL (ref 3.8–5.2)
SP GR UR STRIP: 1.02 (ref 1–1.03)
SPECIMEN EXPIRATION DATE: NORMAL
UROBILINOGEN UR STRIP-ACNC: 0.2 E.U./DL
WBC # BLD AUTO: 8.8 10E3/UL (ref 4–11)

## 2022-01-12 PROCEDURE — 76817 TRANSVAGINAL US OBSTETRIC: CPT | Performed by: OBSTETRICS & GYNECOLOGY

## 2022-01-12 PROCEDURE — 87086 URINE CULTURE/COLONY COUNT: CPT | Performed by: OBSTETRICS & GYNECOLOGY

## 2022-01-12 PROCEDURE — 99207 PR FIRST OB VISIT: CPT | Performed by: OBSTETRICS & GYNECOLOGY

## 2022-01-12 PROCEDURE — 82306 VITAMIN D 25 HYDROXY: CPT | Performed by: OBSTETRICS & GYNECOLOGY

## 2022-01-12 PROCEDURE — 86803 HEPATITIS C AB TEST: CPT | Performed by: OBSTETRICS & GYNECOLOGY

## 2022-01-12 PROCEDURE — 86780 TREPONEMA PALLIDUM: CPT | Performed by: OBSTETRICS & GYNECOLOGY

## 2022-01-12 PROCEDURE — 87340 HEPATITIS B SURFACE AG IA: CPT | Performed by: OBSTETRICS & GYNECOLOGY

## 2022-01-12 PROCEDURE — 36415 COLL VENOUS BLD VENIPUNCTURE: CPT | Performed by: OBSTETRICS & GYNECOLOGY

## 2022-01-12 PROCEDURE — 86901 BLOOD TYPING SEROLOGIC RH(D): CPT | Performed by: OBSTETRICS & GYNECOLOGY

## 2022-01-12 PROCEDURE — 85027 COMPLETE CBC AUTOMATED: CPT | Performed by: OBSTETRICS & GYNECOLOGY

## 2022-01-12 PROCEDURE — 86850 RBC ANTIBODY SCREEN: CPT | Performed by: OBSTETRICS & GYNECOLOGY

## 2022-01-12 PROCEDURE — 86762 RUBELLA ANTIBODY: CPT | Performed by: OBSTETRICS & GYNECOLOGY

## 2022-01-12 PROCEDURE — 86900 BLOOD TYPING SEROLOGIC ABO: CPT | Performed by: OBSTETRICS & GYNECOLOGY

## 2022-01-12 PROCEDURE — 81003 URINALYSIS AUTO W/O SCOPE: CPT | Performed by: OBSTETRICS & GYNECOLOGY

## 2022-01-12 PROCEDURE — 87389 HIV-1 AG W/HIV-1&-2 AB AG IA: CPT | Performed by: OBSTETRICS & GYNECOLOGY

## 2022-01-12 NOTE — PROGRESS NOTES
SUBJECTIVE:      HPI:     This is a 26 year old female patient,  who presents for her first obstetrical visit.     ANDREINA: 2022, by Last Menstrual Period.  She is 10w2d weeks at time of nurse visit 21..  Her cycles are regular.  Her last menstrual period was normal.   Since her LMP, she has experienced  nausea.     Additional History: first pregnancy     Have you travelled during the pregnancy?No  Have your sexual partner(s) travelled during the pregnancy?No     HISTORY:   Planned Pregnancy: Yes  Marital Status:   Occupation: works for non profit  Living in Household: Spouse-Crow     Past History:  Her past medical history   Past Medical History        Past Medical History:   Diagnosis Date     Anxiety       Asthma       Depressive disorder       Fibromyalgia       PTSD (post-traumatic stress disorder)       Vitamin D deficiency        .       She has a history of  first pregnancy     Since her last LMP she denies use of alcohol, tobacco and street drugs.     Past medical, surgical, social and family history were reviewed and updated in [facility].            Current Outpatient Medications   Medication     albuterol (PROAIR HFA/PROVENTIL HFA/VENTOLIN HFA) 108 (90 Base) MCG/ACT inhaler     amoxicillin-clavulanate (AUGMENTIN) 875-125 MG tablet     busPIRone (BUSPAR) 10 MG tablet     calcium carbonate 750 MG CHEW     DULoxetine (CYMBALTA) 30 MG capsule     etonogestrel (IMPLANON/NEXPLANON) 68 MG IMPL     fluconazole (DIFLUCAN) 150 MG tablet     folic acid (FOLVITE) 1 MG tablet     HEMP OIL OR EXTRACT OR OTHER CBD CANNABINOID, NOT MEDICAL CANNABIS,     vitamin (B COMPLEX-C) tablet     vitamin D3 (CHOLECALCIFEROL) 2000 units (50 mcg) tablet      No current facility-administered medications for this visit.         ROS:   12 point review of systems negative other than symptoms noted below or in the HPI.  Gastrointestinal: Nausea  OBJECTIVE:      Nurse phone visit completed. Prenatal book and folder  "containing standard educational hand-outs and brochures will be given at the next visit to patient. Information in folder reviewed over the phone and sent via [email or web address] Questions answered. Information given on optional screening available to assess chromosomal anomalies. Pt desires NIPS. Pt advised to call the clinic if she has any questions or concerns related to her pregnancy. Prenatal labs future ordered.   Suzy Means RN on 2021 at 2:00 PM     PHQ-9 score:    PHQ 2021   PHQ-9 Total Score 4   Q9: Thoughts of better off dead/self-harm past 2 weeks Not at all       MICHAEL-7 SCORE 2021   Total Score 8 (mild anxiety)   Total Score 8         EXAM:  Ht 1.6 m (5' 3\")   Wt 108.9 kg (240 lb)   LMP 10/20/2021   BMI 42.51 kg/m   Body mass index is 42.51 kg/m .       ASSESSMENT/PLAN:       ICD-10-CM    1. Encounter for supervision of normal first pregnancy in first trimester  Z34.01        26 year old , On 2022 patient is 12w0d weeks of pregnancy with ANDREINA of 2022, by Last Menstrual Period      Counseling given:   - Follow up in 4-6 weeks for return OB visit.      PLAN/PATIENT INSTRUCTIONS:    1. Prenatal labs  2. Genetic testing  3. COVID vaccine: moderna, discussed booster.  4. Flu vaccine: she is considering, doesn't want today  5. return to clinic 4 weeks  6. Level 2 referral sent to [facility], will discussed delivery planning  7. Discussed NT, she will have that done as well  8. Vit D continue, vit D level  9. Discussed peds she will look for that around her house.         Valorie Taylor MD      "

## 2022-01-13 ENCOUNTER — PRE VISIT (OUTPATIENT)
Dept: MATERNAL FETAL MEDICINE | Facility: CLINIC | Age: 27
End: 2022-01-13
Payer: COMMERCIAL

## 2022-01-13 LAB
BACTERIA UR CULT: NORMAL
RUBV IGG SERPL QL IA: 6.96 INDEX
RUBV IGG SERPL QL IA: POSITIVE
T PALLIDUM AB SER QL: NONREACTIVE

## 2022-01-17 ENCOUNTER — HOSPITAL ENCOUNTER (OUTPATIENT)
Dept: ULTRASOUND IMAGING | Facility: CLINIC | Age: 27
End: 2022-01-17
Attending: OBSTETRICS & GYNECOLOGY
Payer: COMMERCIAL

## 2022-01-17 ENCOUNTER — OFFICE VISIT (OUTPATIENT)
Dept: MATERNAL FETAL MEDICINE | Facility: CLINIC | Age: 27
End: 2022-01-17
Attending: OBSTETRICS & GYNECOLOGY
Payer: COMMERCIAL

## 2022-01-17 DIAGNOSIS — Z36.9 ENCOUNTER FOR ANTENATAL SCREENING: Primary | ICD-10-CM

## 2022-01-17 DIAGNOSIS — O26.90 PREGNANCY RELATED CONDITION, ANTEPARTUM: ICD-10-CM

## 2022-01-17 DIAGNOSIS — O26.90 PREGNANCY RELATED CONDITION, ANTEPARTUM: Primary | ICD-10-CM

## 2022-01-17 DIAGNOSIS — Z31.5 ENCOUNTER FOR PROCREATIVE GENETIC COUNSELING: ICD-10-CM

## 2022-01-17 PROCEDURE — 96040 HC GENETIC COUNSELING, EACH 30 MINUTES: CPT | Performed by: GENETIC COUNSELOR, MS

## 2022-01-17 PROCEDURE — 76801 OB US < 14 WKS SINGLE FETUS: CPT | Mod: 26 | Performed by: OBSTETRICS & GYNECOLOGY

## 2022-01-17 PROCEDURE — 76801 OB US < 14 WKS SINGLE FETUS: CPT

## 2022-01-17 NOTE — PROGRESS NOTES
"Please see \"Imaging\" tab under \"Chart Review\" for details of today's visit.    Ulysses Ceballos    "

## 2022-01-17 NOTE — PROGRESS NOTES
[facility] Maternal [facility]etal [facility]  Genetic Counseling Consult    Patient: Naomi Morales YOB: 1995   Date of Service: 22      Naomi Morales was seen at [facility] for genetic consultation to discuss the options for screening and diagnostic testing in the current pregnancy. The patient was accompanied by her partner, Crow to today's visit.       Impression/Plan:   1.  Naomi underwent NIPT earlier in this pregnancy with her primary OB, which is still pending. She had an NT ultrasound today, see corresponding report. Maternal serum AFP (single marker screen) is recommended after 15 weeks to screen for open neural tube defects. A quad screen should not be performed.    2. Naomi's partner, Crow has a history of retinoblastoma with an identified RB1 germline variant. We reviewed availability of prenatal diagnostic testing or coordinating  evaluation and testing. The couple has declined prenatal diagnostic testing and would like to plan for cordblood testing at delivery. We reviewed two week turn around time for results. I will reach out to our pediatric genetics eye clinic for recommendations regarding plan for  evaluation and follow up with Naomi. Naomi is scheduled to return  for level II comprehensive ultrasound.     Pregnancy History:   /Parity:    Age at Delivery: 27 year old  ANDREINA: 2022, by Last Menstrual Period  Gestational Age: 12w5d    No significant complications or exposures were reported in the current pregnancy.    Medical History:   Namoi s reported medical history is not expected to impact pregnancy management or risks to fetal development.       Family History:   A three-generation pedigree was previously obtained, and is scanned under the  Media  tab.   The following significant findings were reported by Naomi:    Naomi's partner, Crow has a history of retinoblastoma with an identified  pathogenic germline RB1 variant ([id number], [id number]). A copy of the letter from [facility] molecular diagnostics laboratory is in Crow's chart under lab from . The couple had a preconception genetic counseling consultation with VIJAYA on 2021 (see corresponding note in Crow's chart MRN: [id number]). At that time, reproductive risks and available testing options in the setting of Crow's history were discussed at length. Today we reviewed autosomal dominant inheritance and that the couple's children will have a 50% chance of also inheriting the RB1 variant as it was identified in Crow's germline testing. Reviewed availability of prenatal diagnostic testing by [facility] or amniocentesis as well as the option to plan for  evaluation and genetic testing. We reviewed benefits and limitations of prenatal ultrasound or MRI, which may reveal a moderately large retinoblastoma in the eye of an affected fetus however, may not detect small retinoblastoma tumors. There is some evidence that early term delivery (>36 weeks gestational age) could be considered in prenatally diagnosed retinoblastoma or with RB1 pathogenic variants to allow for earlier assessment and treatment of developing tumors (Yoanna et al 2016). I will reach out to our pediatric genetics eye clinic for recommendations regarding plan for  evaluation and follow up with Naomi.     Crow's mother was also reported to have a pathogenic BRCA2 genetic variant that has also been found in his maternal grandmother and maternal uncle. Today we reviewed the autosomal dominant nature of BRCA2 variants and the associated cancer risks, and discussed that this history puts Crow, as well as each of this 3 siblings, at 50% risk for having inherited the familial BRCA2 variant. Jaime was previously provided information to coordinate an appointment with our cancer risk management clinic, however has not pursued this yet. We reviewed that if Crow also  carries the familial BRCA2 variant, the couple's children would have a 50% chance of also inheriting the variant and this information should be shared with their primary care providers.     Otherwise, the reported family history is negative for multiple miscarriages, stillbirths, birth defects, mental retardation, known genetic conditions, and consanguinity.       Carrier Screening:   The couple previously underwent expanded carrier screening through [facility] laboratory with [facility]. Naomi was negative for the 289 conditions and Crow was identified to be a carrier for Juan A disease, an autosomal recessive condition. We reviewed that they were not identified to be carrier for the same conditions, greatly reducing the chance to have a child affected with any of the conditions screened for. The couple's children have a 50% chance of also being a carrier for Juan A disease and this information should be shared with their children and their child's primary care provider.         Risk Assessment for Chromosome Conditions:   We explained that the risk for fetal chromosome abnormalities increases with maternal age. We discussed specific features of common chromosome abnormalities, including Down syndrome, trisomy 13, trisomy 18, and sex chromosome trisomies.      - At age 24 at midtrimester, the risk to have a baby with Down syndrome is 1 in 1040.    - At age 24 at midtrimester, the risk to have a baby with any chromosome abnormality is 1 in 520.       Naomi underwent NIPT earlier in this pregnancy with her primary OB, which is still pending. We reviewed implications of positive and negative results.        Testing Options:   We discussed the following options:   Chorionic villus sampling ([facility])    Invasive procedure typically performed in the first trimester by which placental villi are obtained for the purpose of chromosome analysis and/or other prenatal genetic analysis. This could include testing for the familial RB1 variant.      Diagnostic results; >99% sensitivity for fetal chromosome abnormalities    Cannot test for open neural tube defects; maternal serum AFP after 15 weeks is recommended     Genetic Amniocentesis    Invasive procedure typically performed in the second trimester by which amniotic fluid is obtained for the purpose of chromosome analysis and/or other prenatal genetic analysis. This could include testing for the familial RB1 variant.     Diagnostic results; >99% sensitivity for fetal chromosome abnormalities    AFAFP measurement tests for open neural tube defects     Comprehensive (Level II) ultrasound: Detailed ultrasound performed between 18-22 weeks gestation to screen for major birth defects and markers for aneuploidy.      We reviewed the benefits and limitations of this testing.  Screening tests provide a risk assessment specific to the pregnancy for certain fetal chromosome abnormalities, but cannot definitively diagnose or exclude a fetal chromosome abnormality.  Follow-up genetic counseling and consideration of diagnostic testing is recommended with any abnormal screening result.     Diagnostic tests carry inherent risks- including risk of miscarriage- that require careful consideration.  These tests can detect fetal chromosome abnormalities with greater than 99% certainty.  Results can be compromised by maternal cell contamination or mosaicism, and are limited by the resolution of cytogenetic G-banding technology.  There is no screening nor diagnostic test that can detect all forms of birth defects or mental disability.     It was a pleasure to be involved with [facility]. Face-to-face time of the meeting was 30 minutes.    Nikole Matta MS, [facility]  Licensed Genetic Counselor  [facility]  Maternal Fetal Medicine  Ph: [phone]  [email or web address]

## 2022-01-21 DIAGNOSIS — Z13.79 GENETIC SCREENING: Primary | ICD-10-CM

## 2022-01-24 ENCOUNTER — HOSPITAL ENCOUNTER (OUTPATIENT)
Dept: ULTRASOUND IMAGING | Facility: CLINIC | Age: 27
End: 2022-01-24
Attending: OBSTETRICS & GYNECOLOGY
Payer: COMMERCIAL

## 2022-01-24 ENCOUNTER — OFFICE VISIT (OUTPATIENT)
Dept: MATERNAL FETAL MEDICINE | Facility: CLINIC | Age: 27
End: 2022-01-24
Attending: OBSTETRICS & GYNECOLOGY
Payer: COMMERCIAL

## 2022-01-24 ENCOUNTER — LAB (OUTPATIENT)
Dept: LAB | Facility: CLINIC | Age: 27
End: 2022-01-24
Payer: COMMERCIAL

## 2022-01-24 DIAGNOSIS — Z13.79 GENETIC SCREENING: ICD-10-CM

## 2022-01-24 DIAGNOSIS — O35.2XX0 HEREDITARY DISEASE IN FAMILY POSSIBLY AFFECTING FETUS, AFFECTING MANAGEMENT OF MOTHER IN PREGNANCY, SINGLE OR UNSPECIFIED FETUS: Primary | ICD-10-CM

## 2022-01-24 DIAGNOSIS — O26.90 PREGNANCY RELATED CONDITION, ANTEPARTUM: ICD-10-CM

## 2022-01-24 PROCEDURE — 76813 OB US NUCHAL MEAS 1 GEST: CPT

## 2022-01-24 PROCEDURE — 76813 OB US NUCHAL MEAS 1 GEST: CPT | Mod: 26 | Performed by: OBSTETRICS & GYNECOLOGY

## 2022-01-24 PROCEDURE — 36415 COLL VENOUS BLD VENIPUNCTURE: CPT

## 2022-01-24 NOTE — PROGRESS NOTES
Please see full imaging report from [employer] program under imaging tab.    Elina Falcon MD  Maternal Fetal Medicine

## 2022-01-31 ENCOUNTER — TELEPHONE (OUTPATIENT)
Dept: MATERNAL FETAL MEDICINE | Facility: CLINIC | Age: 27
End: 2022-01-31
Payer: COMMERCIAL

## 2022-01-31 DIAGNOSIS — O35.2XX0 HEREDITARY DISEASE IN FAMILY POSSIBLY AFFECTING FETUS, AFFECTING MANAGEMENT OF MOTHER IN PREGNANCY, SINGLE OR UNSPECIFIED FETUS: Primary | ICD-10-CM

## 2022-01-31 NOTE — TELEPHONE ENCOUNTER
2022    I spoke with Naomi and Crow regarding plan of care in this pregnancy. The couple met with genetic counseling in this pregnancy previously, see note from 2022.     Crow has a history of retinoblastoma with an identified RB1 germline variant conferring a 50% risk to the current pregnancy.  They have declined prenatal diagnostic testing and would like to plan for cordblood testing at delivery. I spoke with our pediatric eye clinic and they would be happy to meet with the couple prenatally for a  only appointment to coordinate plan for  testing and evaluation. This will be coordinated once a plan for delivery has been established.     We reviewed benefits and limitations of prenatal ultrasound or MRI, which may reveal a moderately large retinoblastoma in the eye of an affected fetus however, may not detect small retinoblastoma tumors. There is some evidence that early term delivery (>36 weeks gestational age) could be considered in prenatally diagnosed retinoblastoma or with RB1 pathogenic variants to allow for earlier assessment and treatment of developing tumors (Yoanna et al 2016). The couple is interested in discussing benefits and limitations of an early planned delivery in the setting of unknown fetal genotype (50% risk). We reviewed that if the couple delivers at [facility], Dr. Morales, pediatric ophthalmologist could visit family inpatient and perform exam at that time. Naomi had previously shared that they would consider [facility] if delivery is planned, however because Crow is unable to drive, if she goes into labor they would likely have to go to [facility] due to close location.     Our team will reach out to Naomi to coordinate level II ultrasound with VIJAYA MELENDEZ consultation at [facility] to discuss plan of care. She was encouraged to reach out with any additional questions.     Nikole Matta MS, [facility]  Licensed Genetic Counselor  [facility]  Maternal Fetal Medicine  Ph:  [phone]  [email or web address]

## 2022-02-02 ENCOUNTER — TELEPHONE (OUTPATIENT)
Dept: OBGYN | Facility: CLINIC | Age: 27
End: 2022-02-02
Payer: COMMERCIAL

## 2022-02-02 LAB — SCANNED LAB RESULT: NORMAL

## 2022-02-02 NOTE — TELEPHONE ENCOUNTER
15w0d  Monday started a cough which has worsened to HA, ear ache, and congestion.  Reviewed with pt over the phone safe meds to use for sx in pregnancy. Push fluids and rest.   Encouraged pt to be screened for covid and let us know results. Informed her would want to keep a closer eye on her in pregnancy if covid positive.  She has an apple watch that screens her oxygen levels.    She will get covid tested and let us know results.  Ginny Arredondo RN on 2/2/2022 at 4:06 PM

## 2022-02-02 NOTE — TELEPHONE ENCOUNTER
Pt is 15 wks, wanting to know what she can take for sinus infection/pain    Please callback: [phone]

## 2022-02-09 ENCOUNTER — PRENATAL OFFICE VISIT (OUTPATIENT)
Dept: OBGYN | Facility: CLINIC | Age: 27
End: 2022-02-09
Payer: COMMERCIAL

## 2022-02-09 DIAGNOSIS — Z86.16 HISTORY OF COVID-19: Primary | ICD-10-CM

## 2022-02-09 PROCEDURE — 99207 PR PRENATAL VISIT: CPT | Performed by: OBSTETRICS & GYNECOLOGY

## 2022-02-09 NOTE — PROGRESS NOTES
She has her mfm ultrasound scheduled already.  She is covid positive as of 1/31/22  She didn't get the booster at this time  Plan on cbc, cmp when she comes into clinic  She didn't get the covid booster      She is feeling ok at this time.  Right now she is having more congestion.  She is having occl cough.   She isn't having shortness of breath  She had some headaches last week     Phone call 6 minutes 25 seconds

## 2022-03-04 ENCOUNTER — OFFICE VISIT (OUTPATIENT)
Dept: MATERNAL FETAL MEDICINE | Facility: CLINIC | Age: 27
End: 2022-03-04
Attending: OBSTETRICS & GYNECOLOGY
Payer: COMMERCIAL

## 2022-03-04 ENCOUNTER — HOSPITAL ENCOUNTER (OUTPATIENT)
Dept: ULTRASOUND IMAGING | Facility: CLINIC | Age: 27
End: 2022-03-04
Attending: OBSTETRICS & GYNECOLOGY
Payer: COMMERCIAL

## 2022-03-04 VITALS
SYSTOLIC BLOOD PRESSURE: 117 MMHG | RESPIRATION RATE: 18 BRPM | HEART RATE: 94 BPM | OXYGEN SATURATION: 96 % | DIASTOLIC BLOOD PRESSURE: 74 MMHG

## 2022-03-04 DIAGNOSIS — O99.212 OBESITY AFFECTING PREGNANCY IN SECOND TRIMESTER: ICD-10-CM

## 2022-03-04 DIAGNOSIS — O35.2XX0 HEREDITARY DISEASE IN FAMILY POSSIBLY AFFECTING FETUS, AFFECTING MANAGEMENT OF MOTHER IN PREGNANCY, SINGLE OR UNSPECIFIED FETUS: Primary | ICD-10-CM

## 2022-03-04 DIAGNOSIS — O35.2XX0 HEREDITARY DISEASE IN FAMILY POSSIBLY AFFECTING FETUS, AFFECTING MANAGEMENT OF MOTHER IN PREGNANCY, SINGLE OR UNSPECIFIED FETUS: ICD-10-CM

## 2022-03-04 DIAGNOSIS — O26.90 PREGNANCY RELATED CONDITION, ANTEPARTUM: ICD-10-CM

## 2022-03-04 PROCEDURE — 999N000069 HC STATISTIC GENETIC COUNSELING, < 16 MIN: Performed by: GENETIC COUNSELOR, MS

## 2022-03-04 PROCEDURE — 76811 OB US DETAILED SNGL FETUS: CPT | Mod: 26 | Performed by: OBSTETRICS & GYNECOLOGY

## 2022-03-04 PROCEDURE — 99215 OFFICE O/P EST HI 40 MIN: CPT | Mod: 25 | Performed by: OBSTETRICS & GYNECOLOGY

## 2022-03-04 PROCEDURE — 76811 OB US DETAILED SNGL FETUS: CPT

## 2022-03-04 NOTE — PROGRESS NOTES
[facility]  Genetic Counseling Consult    Patient:  Naomi Morales YOB: 1995   Date of Service:  3/04/22      Naomi Morales was seen at the [facility] for comprehensive ultrasound. I met with the couple after their ultrasound to continue our discussion surrounding th eplan for prenatal and  care as the current pregnancy is at 50% chance for retinoblastoma.        Impression/Plan:   1. Naomi had a comprehensive (level II) ultrasound today.  Please see the ultrasound report for further details.    2. The couple met with genetic counseling earlier in this pregnancy, please see corresponding report. Naomi's partner, Crow has a history of retinoblastoma with an identified RB1 germline variant which puts the current pregnancy at 50% risk. The couple is undecided on how they would like to move forward regarding if they would like to pursue genetic amniocentesis and plan for possible early delivery. They have additional questions surrounding  intervention and I will follow up with the couple after hearing back from our pediatric ophthalmology clinic about possibility of prenatal consult to address these questions.      Pregnancy History:   /Parity:    Age at Delivery: 27 year old  ANDREINA: 2022, by Last Menstrual Period  Gestational Age: 19w2d       Discussion:   Naomi's partner, Crow has a history of retinoblastoma with an identified pathogenic germline RB1 variant (R225X, [id number]). A copy of the letter from [facility] molecular diagnostics laboratory is in Crow's chart under lab from . This puts the current pregnancy at a 50% chance of also inheriting the RB1 variant.     The couple expressed difficulty in determining the best path forward for testing and coordination of delivery timing in this pregnancy. We reviewed possible paths forward with or without prenatal  diagnosis of fetal RB1. We again reviewed the option of genetic amniocentesis including 1 chance for a complication. If the pregnancy were not found to have the familial RB1 variant, early delivery for this history would not be indicated and the couple would likely continue plan to deliver at [facility]. If the pregnancy was identified to have the familial RB1 variant and associated significant risk for retinoblastoma, early delivery at [place] would likely be recommended to allow for early assessment and treatment of developing tumors. Dr. Morales, pediatric ophthalmologist could visit baby at hospital after delivery for initial exam. We reviewed current literature supporting increased likelihood of infants born with no detectable tumors, better vision outcomes, and less invasive therapy (Yoanna et al, 2016). If amniocentesis is not performed, early delivery could be considered and benefits and limitations of this were discussed with Dr. Jimenez today. Additionally, prenatal ultrasound or MRI could be considered as it may reveal a moderately large retinoblastoma in the eye of an affected fetus however, may not detect small retinoblastoma tumors.     Naomi and Crow expressed that more information about  intervention would be helpful in their decision making in this pregnancy. They inquired about how quickly intervention can begin and if this differs between 37 and 39 weeks, the amount of infants with tumors at these ages, how significant a tumor can increase in those weeks, how many days postnatally intervention can begin, and how infant eye exams are performed. We reviewed that these would likely be best answered by our pediatric opthalmology team and I have sent a message to Dr. Morales to determine if a prenatal consult would be available to address these. Humaira were provided with the Yoanna et al, 2016 paper regarding prenatal versus  screening for familial retinoblastoma. They  would like some time to further consider option of genetic amniocentesis and were provided with my direct conact information today. I will follow up with the couple after hearing back from our pediatric ophthalmology clinic.       It was a pleasure to be involved with Naomi s care. Face-to-face time of the meeting was 15 minutes.    Nikole Matta MS, [facility]  Licensed Genetic Counselor  [facility]  Maternal Fetal Medicine  Ph: [phone]  [email or web address]

## 2022-03-04 NOTE — NURSING NOTE
Naomi seen in clinic today for OB visit at 19w2d gestation for L2/MFM Consult. Dr. Jimenez met with pt and discussed POC. Plan for F/U US in 3 weeks for growth/suboptimal anatomy. Future visits scheduled at . Pt discharged stable and ambulatory.

## 2022-03-08 ENCOUNTER — TELEPHONE (OUTPATIENT)
Dept: OPHTHALMOLOGY | Facility: CLINIC | Age: 27
End: 2022-03-08
Payer: COMMERCIAL

## 2022-03-08 NOTE — TELEPHONE ENCOUNTER
----- Message from Nicky Sorenson GC sent at 3/8/2022 10:38 AM CST -----  Regarding: RE: Prenatal retinoblastoma guidance  Thanks, Dr. Morales.     Hue, will you let me know when mom is scheduled. I would like to meet with the parents ahead of delivery to consent for the genetic testing and get the maternal and paternal samples sent to the genetic testing lab ahead of time. Maybe I can meet with them after Dr. Morales if I am available on the same day.    Thanks!   Valarie  ----- Message -----  From: Arcelia Morales MD  Sent: 3/8/2022   9:14 AM CST  To: Nicky Sorenson GC, Nikole Matta GC, #  Subject: RE: Prenatal retinoblastoma guidance             Nikole,  I could see them for a clinic appointment.  I guess the appointment would be under Mom's name.  Hue, can you schedule an appointment if they want to meet and discuss RB concerns in clinic.  Thanks,  Arcelia  ----- Message -----  From: Nikole Matta GC  Sent: 3/4/2022   3:42 PM CST  To: Arcelia Morales MD, #  Subject: RE: Prenatal retinoblastoma guidance             Ben Morales!    This family was in our [facility] clinic again today and are really struggling with what path forward they want to take for testing and delivery. Most of their questions seem to surround what immediate intervention for RB1 looks like and the benefits/limitations of this at 37 weeks GA vs 39 weeks GA. They have questions about the amount of infants with tumors at these ages, how significant a tumor can increase in those weeks, how many days postnatally intervention can begin, and how infant eye exams are performed.     Would there be a way this family could meet with you prenatally to discuss  intervention for RB1 as I think this information will really help inform them in their decision making in the ongoing pregnancy.     Thank you!    Nikole Matta MS, [facility]  Licensed Genetic Counselor  [facility]  Maternal Fetal Medicine  Ph:  [phone]  [email or web address]    ----- Message -----  From: Nicky Sorenson GC  Sent: 1/26/2022   2:08 PM CST  To: Arcelia Morales MD, iNkole Matta GC, #  Subject: RE: Prenatal retinoblastoma guidance             Thank you, Dr. Morales. We appreciate your input! The mother is talking with [facility] providers here about an early delivery and location for delivery. If they deliver here, we can make a fetal chart and have orders placed for the genetic testing and to have you see the baby. If they deliver at a different location, I will let Hue know so we can get baby to see you ASAP.     I also reached out to [facility] and they have seen dad's variant before so I am confident they would find it in baby if it is present. We will coordinate testing there with a positive control sample from dad and maternal sample for long-term studies.     Thanks again!   Valarie  ----- Message -----  From: Arcelia Morales MD  Sent: 1/24/2022   9:31 AM CST  To: Nicky Sorenson GC, Nikole Matta GC, #  Subject: RE: Prenatal retinoblastoma guidance             Ben Sheppard,  Yes, If we cannot test the baby in utero, early delivery with eye exam in the first week could be helpful to find tumors when they are smaller and start treatment earlier. If the family does not want early delivery, we should still look as soon as possible after birth.  I would be happy to see the baby in clinic or in the nursery if it is at this hospital. Hue can help with timely scheduling.  Prenatal ultrasound/MRI can find tumors but would miss smaller tumors.    Please use [facility] for the cord blood!  Thanks,  Arcelia  ----- Message -----  From: Nicky Sorenson GC  Sent: 1/17/2022   3:40 PM CST  To: Arcelia Morales MD, Nikole Matta GC  Subject: Prenatal retinoblastoma guidance                 Nikole Lopes Dr., my genetic counseling colleague in [facility], reached out to me because she has a family that is  currently pregnant and the father of the pregnancy has a known RB1 mutation and history of bilateral retinoblastoma. The family has declined all invasive diagnostic genetic testing during the pregnancy to test the baby for the RB1 mutation but are interested in testing on cord blood once baby is born. We are working on coordinating the genetic testing piece once the baby is born but had some questions for you regarding the pregnancy:    Some papers have suggested that when an RB1 mutation is identified in a fetus prenatally, an ultrasound or fetal MRI may be able to detect moderate retinoblastoma in the fetus. They also suggest delivery at 36 weeks even if no tumors are present since 30% of babies with RB1 mutations have small tumors at birth. Would you recommend a fetal MRI or early delivery at 36 weeks for this baby that has a 50% chance of having an RB1 mutation?    Your input is greatly appreciated!     Thanks,     Valarie Sorenson MS, [facility]  Licensed Genetic Counselor  [facility]  Phone: [phone]  Fax: [phone]

## 2022-03-08 NOTE — TELEPHONE ENCOUNTER
Left voicemail for patient regarding setting up a consultation with Dr. Morales. Provided direct number to call back to schedule.    Melanie Jeans, Ophthalmic Assistant

## 2022-03-10 NOTE — TELEPHONE ENCOUNTER
Patient called back to schedule. She was wondering if a virtual appointment would be possible. Informed her that Dr. Morales does not usually do virtual appointments so scheduled for an in person consultation. I will check with Dr. Morales to see if she would prefer to do virtual since it is a discussion about prenatal options related to retinoblastoma. Patient also mentioned that her due date is July 27th.    Melanie Jeans, Ophthalmic Assistant

## 2022-03-15 ENCOUNTER — TELEPHONE (OUTPATIENT)
Dept: CONSULT | Facility: CLINIC | Age: 27
End: 2022-03-15
Payer: COMMERCIAL

## 2022-03-15 NOTE — TELEPHONE ENCOUNTER
I spoke with Naomi about her upcoming visit with Dr. Morales to discuss screening her baby for retinoblastoma after delivery. For the genetic testing for her baby, we will need samples from both Naomi and Crow, so I asked Naomi if they could meet with me in [facility] after their visit with Dr. Morales on 3/31 so I can consent them for the testing for the baby and collect samples from them now, so everything is ready for baby's testing when they are born.     Naomi agreed with this plan and we confirmed date/time and location of the [facility]. Naomi can contact me if she has other questions in the meantime.     Valarie Sorenson MS, [facility]  Licensed Genetic Counselor  [facility]  Phone: [phone]  Fax: [phone]

## 2022-03-29 ENCOUNTER — OFFICE VISIT (OUTPATIENT)
Dept: MATERNAL FETAL MEDICINE | Facility: CLINIC | Age: 27
End: 2022-03-29
Attending: OBSTETRICS & GYNECOLOGY
Payer: COMMERCIAL

## 2022-03-29 ENCOUNTER — HOSPITAL ENCOUNTER (OUTPATIENT)
Dept: ULTRASOUND IMAGING | Facility: CLINIC | Age: 27
Discharge: HOME | End: 2022-03-29
Attending: OBSTETRICS & GYNECOLOGY
Payer: COMMERCIAL

## 2022-03-29 DIAGNOSIS — O35.2XX0 HEREDITARY DISEASE IN FAMILY POSSIBLY AFFECTING FETUS, AFFECTING MANAGEMENT OF MOTHER IN PREGNANCY, SINGLE OR UNSPECIFIED FETUS: ICD-10-CM

## 2022-03-29 DIAGNOSIS — O99.212 OBESITY AFFECTING PREGNANCY IN SECOND TRIMESTER: Primary | ICD-10-CM

## 2022-03-29 PROCEDURE — 76816 OB US FOLLOW-UP PER FETUS: CPT

## 2022-03-29 PROCEDURE — 76816 OB US FOLLOW-UP PER FETUS: CPT | Mod: 26 | Performed by: OBSTETRICS & GYNECOLOGY

## 2022-03-29 NOTE — PROGRESS NOTES
"1) Intrauterine pregnancy at 22 6/7 weeks gestational age.   2) Most of the anatomy not previously seen was visualized today and appears normal.  Some anatomy (cardiac, hands, spine) remain suboptimal.  The remainder of the visualized fetal anatomy appears normal.   3) Growth parameters and estimated fetal weight were consistent with an appropriate for gestation age pattern of growth.  4) The amniotic fluid volume appeared normal.      See prior GC notes for details.  Couple is considering prenatal testing for RB1 mutation (FOB has RB- see GC notes in Epic for details).  They prefer to undergo the amniocentesis to minimize risk of fetal loss (didn't want to do it prenatally) and minimize risk for complications leading to an iatrogenic  delivery.  We discussed undergoing the amniocentesis around 34 weeks in order to complete the genetic testing and have results to make delivery plans.  Reviewed the risks associated with amnio and discussed that rarely the amnio may not be feasible at that time.  If the fetus is positive for the RB1 variant, delivery should be at [facility] to facilitate Peds Ophtho evaluation after birth and timed in the early term period based on literature.      We will see her back in 4 weeks to complete the anatomic views.  Additionally, serial growth US with specific attention to the orbits in the third trimester will be done with MFM.  We reviewed that we can consider a third trimester MRI, however small RB tumors (and even larger ones) may not be detected prenatally due to limitations in imaging.     Couple is meeting with Dr. Morales of Peds Ophtho this week to discuss  management.     Please see \"Imaging\" tab under \"Chart Review\" for details of today's US.      Adrienne Wagner DO [facility]  Maternal Fetal Medicine Specialist  Pager: [phone]  Mobile: [phone]    "

## 2022-03-30 ENCOUNTER — TELEPHONE (OUTPATIENT)
Dept: CONSULT | Facility: CLINIC | Age: 27
End: 2022-03-30
Payer: COMMERCIAL

## 2022-03-30 NOTE — TELEPHONE ENCOUNTER
I called Naomi to touch base about her currently scheduled visit with me tomorrow to see if this visit is still needed or if we should cancel it. I asked Naomi to call me back.     Valarie Sorenson MS, [facility]  Licensed Genetic Counselor  [facility]  Phone: [phone]  Fax: [phone]

## 2022-03-31 ENCOUNTER — OFFICE VISIT (OUTPATIENT)
Dept: OPHTHALMOLOGY | Facility: CLINIC | Age: 27
End: 2022-03-31
Attending: OPHTHALMOLOGY
Payer: COMMERCIAL

## 2022-03-31 ENCOUNTER — OFFICE VISIT (OUTPATIENT)
Dept: CONSULT | Facility: CLINIC | Age: 27
End: 2022-03-31
Attending: GENETIC COUNSELOR, MS
Payer: COMMERCIAL

## 2022-03-31 DIAGNOSIS — Z71.83 ENCOUNTER FOR NONPROCREATIVE GENETIC COUNSELING: ICD-10-CM

## 2022-03-31 DIAGNOSIS — Z80.8 FAMILY HISTORY OF RETINOBLASTOMA: Primary | ICD-10-CM

## 2022-03-31 DIAGNOSIS — Z84.89 FAMILY HISTORY OF GENETIC DISEASE: Primary | ICD-10-CM

## 2022-03-31 PROCEDURE — 36415 COLL VENOUS BLD VENIPUNCTURE: CPT | Performed by: GENETIC COUNSELOR, MS

## 2022-03-31 PROCEDURE — 99203 OFFICE O/P NEW LOW 30 MIN: CPT | Performed by: OPHTHALMOLOGY

## 2022-03-31 PROCEDURE — 999N000069 HC STATISTIC GENETIC COUNSELING, < 16 MIN: Performed by: GENETIC COUNSELOR, MS

## 2022-03-31 NOTE — NURSING NOTE
Chief Complaint(s) and History of Present Illness(es)     Retinoblastoma Evaluation     Comments: Here to discuss RB. + fhx RB (dad with genetic mutation). S/p enucleation LE at 4 yo. S/p several tx BE prior to enucleation. NO Fhx RB on Mom's side. Amniocentesis will be done at 34 wks. Currently 23 wks gestation.              Comments     Dad had bilateral RB, dx at 18 mo, enucleation LE at 4 yr. S/p radioactive plaque, external beam radiation, cryo, chemo inj, systemic chemo, laser BE. BCVA was 20/60 until RD RE  In 8th grade. No was read phone screen with magnifier but difficult to get accurate [place]. Dad had genetic testing done in 2008 through [facility]. Has RB gene mutation. No other positive genetic results on dad's side. No H/o RB on mom's side. Waiting on amniocentesis at 34 wks, currently at 23 wks gestation.

## 2022-03-31 NOTE — PROGRESS NOTES
GENETIC COUNSELING CONSULTATION NOTE    Date of visit: 03/31/22    Presenting Information:   Naomi Lemons is a 26 year old female referred to the [facility] due to her current pregnancy being at risk for hereditary retinoblastoma. She was seen for a genetic counseling appointment today to coordinate blood sample collection for her baby's genetic testing. She was accompanied by her , Crow.     Naomi is currently pregnant with her first child. Her ANDREINA is 7/27/22. Naomi has been working with the [facility] group at [facility] due to her current pregnancy being at risk for hereditary retinoblastoma because the father of the pregnancy, Crow, has a history of retinoblastoma and a known RB1 mutation. Naomi and Crow have decided they would like to try to pursue an amniocentesis to determine if their baby has inherited the familial RB1 mutation. They have met with Dr. Wagner and genetic counselor Nikole Matta to discuss this plan for amniocentesis around 32-34 weeks. If this cannot be done, we will plan to test the baby via cord blood upon delivery.     Naomi and Crow met with Dr. Morales, the pediatric ophthalmologist and retinoblastoma specialist, today to discuss the screening and management plan for the baby at delivery and after if the baby has inherited the familial RB1 mutation.     Family History: A three generation pedigree was previously obtained at Naomi's genetic counseling visit with Nikole Matta and scanned into the electronic medical record. The family history was not reviewed today. Please refer to the scanned pedigree for details.     Genetic Counseling Discussion:  Naomi and Crow are well versed in hereditary retinoblastoma through previous genetic counseling visits and had many of their treatment questions answered by Dr. Morales today. They are aware that their current pregnancy and all future pregnancies have a 50% chance of inheriting Crow's RB1 mutation  (R255X, [id number]). Today, our discussion was focused on the testing plan for their baby:    Naomi and Crow are interested in doing an amniocentesis which their [facility] provider has now recommended be done ~32-34 weeks due to Naomi's current gestation of 23w1d. If the amniocentesis can be performed at this time, a direct amnio sample will be sent to [facility] for testing. Results are estimated to take 7 days. If the lab cannot get enough DNA from the direct amnio sample, they will have to culture cells which can take an additional 2 weeks.     If the amniocentesis cannot be performed (possibly due to size of the fetus, low amniotic fluid volume, etc), we will perform genetic testing on cord blood when the baby is delivered. If the testing is performed on cord blood the results are estimated to take 2-3 weeks.     Regardless of if the testing is done prenatally via amniocentesis or postnatally via cord blood sample, the genetic testing lab ([facility]) is requiring a sample from Naomi for maternal cell contamination (group home) studies and a sample from Crow as a positive control for the baby's mutation testing. These samples were drawn today and will be held at the lab until the baby's sample is received.     If the baby is positive for the RB1 mutation upon amniocentesis testing, delivery will be at [facility] so Dr. Morales can perform an eye exam as soon as the baby is delivered. If we cannot obtain an amnio sample, delivery will still be planned for [facility] so Dr. Morales can do an exam as soon as the baby is born while we await cord blood testing results. If the genetic testing is negative via amniocentesis, Dr. Morales will see the baby for an eye exam at 1-2 weeks.      Naomi consented to provide a blood sample for group home studies today. Her blood was collected and sent to [facility] and will be held until the fetal/cord blood sample arrives.    It was a pleasure meeting Naomi and Crow  today. They were encouraged to reach out to me if they have any further questions.     Plan:  1. Labs drawn today for senior living studies and sent to [facility] to be held until the fetal/cord blood sample arrives for testing.      Valarie Sorenson MS, [facility]  Licensed Genetic Counselor   [facility]  Phone: [phone]  Fax: [phone]    Time spent in consultation face to face was approximately 10 minutes.

## 2022-04-13 NOTE — PROGRESS NOTES
Chief Complaints and History of Present Illnesses   Patient presents with     Retinoblastoma Evaluation     Here to discuss RB. + fhx RB (dad with genetic mutation). S/p enucleation LE at 4 yo. S/p several tx BE prior to enucleation. NO Fhx RB on Mom's side. Amniocentesis will be done at 34 wks. Currently 23 wks gestation.   Review of systems for the eyes was negative other than the pertinent positives and negatives noted in the HPI.  History is obtained from the patient and father of baby    Referring provider: Referred Self   Primary care: Clinic, [facility]   Assessment   Naomi Lemons is a 26 year old female who presents with:       ICD-10-CM    1. Family history of retinoblastoma  Z80.8          Plan  Naomi's partner, Crow, has a history of bilateral retinoblastoma with an identified pathogenic germline RB1 variant (R225X, [id number]). A copy of the letter from [facility] molecular diagnostics laboratory is in Crow's chart under lab from 08/052009. The couple had a preconception genetic counseling consultation with VIJAYA on 09/07/2021. Today we reviewed autosomal dominant inheritance and that the couple's children will have a 50% chance of also inheriting the RB1 variant as it was identified in Crow's germline testing. Earlier detection and treatment of tumors allows better visual outcomes.Crow was diagnosed at around 18 months of age with no family history of RB.  He received chemotherapy and radiation along with focal treatment,  One eye was enucleated.  His remaining eye has limited vision.    Naomi is planning to have amniocentesis at about 34 weeks, getting induced at [facility] at 37 weeks if genetic testing positive for RB mutation or unable to be tested. (Normal delivery at hospital of patient's choice if normal gene testing).  Valarie will help coordinate a cord blood kit to send to [facility] at time of birth if amniocentesis does not provide sample for genetic  "testing.  Family given Hue's phone number to call when baby is born to allow for eye exam within 1-3 days of delivery.    Time spent on history and counseling 40 minutes.     Further details of the management plan can be found in the \"Patient Instructions\" section which was printed and given to the patient at checkout.  No follow-ups on file.   Attending Physician Attestation:  Complete documentation of historical and exam elements from today's encounter can be found in the full encounter summary report (not reduplicated in this progress note).  I personally obtained the chief complaint(s) and history of present illness.  I confirmed and edited as necessary the review of systems, past medical/surgical history, family history, social history, and examination findings as documented by others; and I examined the patient myself.  I personally reviewed the relevant tests, images, and reports as documented above.  I formulated and edited as necessary the assessment and plan and discussed the findings and management plan with the patient and family. - Arcelia Morales MD 4/13/2022 12:01 PM        "

## 2022-04-15 ENCOUNTER — PRENATAL OFFICE VISIT (OUTPATIENT)
Dept: OBGYN | Facility: CLINIC | Age: 27
End: 2022-04-15
Payer: COMMERCIAL

## 2022-04-15 VITALS — DIASTOLIC BLOOD PRESSURE: 64 MMHG | WEIGHT: 235 LBS | SYSTOLIC BLOOD PRESSURE: 102 MMHG | BODY MASS INDEX: 41.63 KG/M2

## 2022-04-15 DIAGNOSIS — O09.92 SUPERVISION OF HIGH RISK PREGNANCY IN SECOND TRIMESTER: Primary | ICD-10-CM

## 2022-04-15 DIAGNOSIS — Z3A.25 25 WEEKS GESTATION OF PREGNANCY: ICD-10-CM

## 2022-04-15 PROCEDURE — 99207 PR PRENATAL VISIT: CPT | Performed by: OBSTETRICS & GYNECOLOGY

## 2022-04-15 NOTE — PROGRESS NOTES
Doing well.  No concerns today. Morning sickness has finally resolved.  1 hour GTT next visit  Prenatal flowsheet information is reviewed.  Discussed PTL, PROM, and when to call or come in.  Discussed kick counts and fetal movement.  Reportable signs and symptoms discussed.  Has follow-up with MFM for imaging and regarding FHx of Retinoblastoma.  See note from 3/29  Follow-up in 3-4 weeks

## 2022-04-17 ENCOUNTER — HEALTH MAINTENANCE LETTER (OUTPATIENT)
Age: 27
End: 2022-04-17

## 2022-04-25 ENCOUNTER — HOSPITAL ENCOUNTER (OUTPATIENT)
Dept: ULTRASOUND IMAGING | Facility: CLINIC | Age: 27
Discharge: HOME | End: 2022-04-25
Attending: OBSTETRICS & GYNECOLOGY
Payer: COMMERCIAL

## 2022-04-25 ENCOUNTER — OFFICE VISIT (OUTPATIENT)
Dept: MATERNAL FETAL MEDICINE | Facility: CLINIC | Age: 27
End: 2022-04-25
Attending: OBSTETRICS & GYNECOLOGY
Payer: COMMERCIAL

## 2022-04-25 DIAGNOSIS — O35.2XX0 HEREDITARY DISEASE IN FAMILY POSSIBLY AFFECTING FETUS, AFFECTING MANAGEMENT OF MOTHER IN PREGNANCY, SINGLE OR UNSPECIFIED FETUS: ICD-10-CM

## 2022-04-25 DIAGNOSIS — O99.212 OBESITY AFFECTING PREGNANCY IN SECOND TRIMESTER: ICD-10-CM

## 2022-04-25 DIAGNOSIS — Z36.2 ENCOUNTER FOR FOLLOW-UP ULTRASOUND OF FETAL ANATOMY: Primary | ICD-10-CM

## 2022-04-25 PROCEDURE — 76816 OB US FOLLOW-UP PER FETUS: CPT

## 2022-04-25 PROCEDURE — 76816 OB US FOLLOW-UP PER FETUS: CPT | Mod: 26 | Performed by: OBSTETRICS & GYNECOLOGY

## 2022-04-25 NOTE — PROGRESS NOTES
"Please see \"imaging\" tab under \"Chart Review\" for details of today's US at the [facility].    Pedro Weiss MD  Maternal-Fetal Medicine    "

## 2022-05-02 DIAGNOSIS — Z36.9 ENCOUNTER FOR ANTENATAL SCREENING OF MOTHER: Primary | ICD-10-CM

## 2022-05-02 DIAGNOSIS — Z23 NEED FOR TDAP VACCINATION: ICD-10-CM

## 2022-05-10 ENCOUNTER — LAB (OUTPATIENT)
Dept: LAB | Facility: CLINIC | Age: 27
End: 2022-05-10

## 2022-05-10 ENCOUNTER — PRENATAL OFFICE VISIT (OUTPATIENT)
Dept: OBGYN | Facility: CLINIC | Age: 27
End: 2022-05-10
Payer: COMMERCIAL

## 2022-05-10 VITALS — SYSTOLIC BLOOD PRESSURE: 108 MMHG | WEIGHT: 239.6 LBS | BODY MASS INDEX: 42.44 KG/M2 | DIASTOLIC BLOOD PRESSURE: 64 MMHG

## 2022-05-10 DIAGNOSIS — O09.92 SUPERVISION OF HIGH RISK PREGNANCY IN SECOND TRIMESTER: Primary | ICD-10-CM

## 2022-05-10 DIAGNOSIS — Z36.9 ENCOUNTER FOR ANTENATAL SCREENING OF MOTHER: ICD-10-CM

## 2022-05-10 DIAGNOSIS — Z23 NEED FOR TDAP VACCINATION: ICD-10-CM

## 2022-05-10 DIAGNOSIS — R73.02 IMPAIRED GLUCOSE TOLERANCE: ICD-10-CM

## 2022-05-10 DIAGNOSIS — Z3A.28 28 WEEKS GESTATION OF PREGNANCY: ICD-10-CM

## 2022-05-10 PROBLEM — O09.90 SUPERVISION OF HIGH-RISK PREGNANCY: Status: ACTIVE | Noted: 2021-12-31

## 2022-05-10 LAB
ERYTHROCYTE [DISTWIDTH] IN BLOOD BY AUTOMATED COUNT: 12.7 % (ref 10–15)
GLUCOSE 1H P 50 G GLC PO SERPL-MCNC: 154 MG/DL (ref 70–129)
HCT VFR BLD AUTO: 37 % (ref 35–47)
HGB BLD-MCNC: 12 G/DL (ref 11.7–15.7)
MCH RBC QN AUTO: 27.2 PG (ref 26.5–33)
MCHC RBC AUTO-ENTMCNC: 32.4 G/DL (ref 31.5–36.5)
MCV RBC AUTO: 84 FL (ref 78–100)
PLATELET # BLD AUTO: 273 10E3/UL (ref 150–450)
RBC # BLD AUTO: 4.41 10E6/UL (ref 3.8–5.2)
WBC # BLD AUTO: 8 10E3/UL (ref 4–11)

## 2022-05-10 PROCEDURE — 90471 IMMUNIZATION ADMIN: CPT | Performed by: OBSTETRICS & GYNECOLOGY

## 2022-05-10 PROCEDURE — 85027 COMPLETE CBC AUTOMATED: CPT

## 2022-05-10 PROCEDURE — 36415 COLL VENOUS BLD VENIPUNCTURE: CPT

## 2022-05-10 PROCEDURE — 82950 GLUCOSE TEST: CPT

## 2022-05-10 PROCEDURE — 99207 PR PRENATAL VISIT: CPT | Performed by: OBSTETRICS & GYNECOLOGY

## 2022-05-10 PROCEDURE — 90715 TDAP VACCINE 7 YRS/> IM: CPT | Performed by: OBSTETRICS & GYNECOLOGY

## 2022-05-10 RX ORDER — PRENATAL VIT/IRON FUM/FOLIC AC 27MG-0.8MG
1 TABLET ORAL DAILY
COMMUNITY

## 2022-05-10 NOTE — NURSING NOTE
Prior to immunization administration, verified patients identity using patient s name and date of birth. Please see Immunization Activity for additional information.     Screening Questionnaire for Adult Immunization    Are you sick today?   No   Do you have allergies to medications, food, a vaccine component or latex?   No   Have you ever had a serious reaction after receiving a vaccination?   No   Do you have a long-term health problem with heart, lung, kidney, or metabolic disease (e.g., diabetes), asthma, a blood disorder, no spleen, complement component deficiency, a cochlear implant, or a spinal fluid leak?  Are you on long-term aspirin therapy?   No   Do you have cancer, leukemia, HIV/AIDS, or any other immune system problem?   No   Do you have a parent, brother, or sister with an immune system problem?   No   In the past 3 months, have you taken medications that affect  your immune system, such as prednisone, other steroids, or anticancer drugs; drugs for the treatment of rheumatoid arthritis, Crohn s disease, or psoriasis; or have you had radiation treatments?   No   Have you had a seizure, or a brain or other nervous system problem?   No   During the past year, have you received a transfusion of blood or blood    products, or been given immune (gamma) globulin or antiviral drug?   No   For women: Are you pregnant or is there a chance you could become       pregnant during the next month?   Yes   Have you received any vaccinations in the past 4 weeks?   No     Immunization questionnaire answers were all negative.        Per orders of Dr. Lugo, injection of Tdap given by Trina Burgos CMA. Patient instructed to remain in clinic for 15 minutes afterwards, and to report any adverse reaction to me immediately.       Screening performed by Trina Burgos CMA on 5/10/2022 at 9:33 AM.

## 2022-05-10 NOTE — PROGRESS NOTES
Doing well.  No concerns today.  1 hour GTT done today.  Prenatal flowsheet information is reviewed.  Discussed PTL, PROM, and when to call or come in.  Discussed kick counts and fetal movement.  NST done today and was reactive.  Reportable signs and symptoms discussed.  Follow-up in 2 weeks

## 2022-05-19 ENCOUNTER — LAB (OUTPATIENT)
Dept: LAB | Facility: CLINIC | Age: 27
End: 2022-05-19
Payer: COMMERCIAL

## 2022-05-19 DIAGNOSIS — R73.02 IMPAIRED GLUCOSE TOLERANCE: ICD-10-CM

## 2022-05-19 DIAGNOSIS — O26.90 PREGNANCY RELATED CONDITION, ANTEPARTUM: ICD-10-CM

## 2022-05-19 DIAGNOSIS — O35.2XX0 HEREDITARY DISEASE IN FAMILY POSSIBLY AFFECTING FETUS, AFFECTING MANAGEMENT OF MOTHER IN PREGNANCY, SINGLE OR UNSPECIFIED FETUS: Primary | ICD-10-CM

## 2022-05-19 PROCEDURE — 82952 GTT-ADDED SAMPLES: CPT

## 2022-05-19 PROCEDURE — 82951 GLUCOSE TOLERANCE TEST (GTT): CPT

## 2022-05-19 PROCEDURE — 36415 COLL VENOUS BLD VENIPUNCTURE: CPT

## 2022-05-20 LAB
GESTATIONAL GTT 1 HR POST DOSE: 189 MG/DL (ref 60–179)
GESTATIONAL GTT 2 HR POST DOSE: 153 MG/DL (ref 60–154)
GESTATIONAL GTT 3 HR POST DOSE: 105 MG/DL (ref 60–139)
GLUCOSE P FAST SERPL-MCNC: 76 MG/DL (ref 60–94)

## 2022-05-24 ENCOUNTER — TELEPHONE (OUTPATIENT)
Dept: MATERNAL FETAL MEDICINE | Facility: CLINIC | Age: 27
End: 2022-05-24
Payer: COMMERCIAL

## 2022-05-24 NOTE — TELEPHONE ENCOUNTER
May 24, 2022    Patient currently scheduled for late Thursday amniocentesis procedure. [facility] does not receive samples on weekend. Call to patient to reschedule procedure to date that is earlier in the week to ensure sample can be received at performing lab. Naomi is scheduled for GC phone consent on 6/2 and follow up ultrasound with procedure on 6/7.     Nikole Matta MS, [facility]  Licensed Genetic Counselor  [facility]  Maternal Fetal Medicine  Ph: [phone]  [email or web address]

## 2022-05-25 ENCOUNTER — PRENATAL OFFICE VISIT (OUTPATIENT)
Dept: OBGYN | Facility: CLINIC | Age: 27
End: 2022-05-25
Payer: COMMERCIAL

## 2022-05-25 VITALS — SYSTOLIC BLOOD PRESSURE: 102 MMHG | DIASTOLIC BLOOD PRESSURE: 68 MMHG | WEIGHT: 241 LBS | BODY MASS INDEX: 42.69 KG/M2

## 2022-05-25 DIAGNOSIS — O98.513 COVID-19 AFFECTING PREGNANCY IN THIRD TRIMESTER: ICD-10-CM

## 2022-05-25 DIAGNOSIS — Z80.8 FAMILY HISTORY OF RETINOBLASTOMA: ICD-10-CM

## 2022-05-25 DIAGNOSIS — U07.1 COVID-19 AFFECTING PREGNANCY IN THIRD TRIMESTER: ICD-10-CM

## 2022-05-25 DIAGNOSIS — O09.93 SUPERVISION OF HIGH RISK PREGNANCY IN THIRD TRIMESTER: Primary | ICD-10-CM

## 2022-05-25 DIAGNOSIS — O99.213 MATERNAL MORBID OBESITY IN THIRD TRIMESTER, ANTEPARTUM (H): ICD-10-CM

## 2022-05-25 DIAGNOSIS — E66.01 MATERNAL MORBID OBESITY IN THIRD TRIMESTER, ANTEPARTUM (H): ICD-10-CM

## 2022-05-25 PROCEDURE — 99207 PR PRENATAL VISIT: CPT | Performed by: OBSTETRICS & GYNECOLOGY

## 2022-05-25 PROCEDURE — 59425 ANTEPARTUM CARE ONLY: CPT | Performed by: OBSTETRICS & GYNECOLOGY

## 2022-05-25 NOTE — PROGRESS NOTES
- The patient is scheduled for an amniocentesis on 6/7/22 as well as likely a repeat growth d/t retinoblastoma in FOB. Had been offered this since 18 weeks but wouldn't terminate anyway and wanted to minimize risks of amnio in 2nd tri. If baby is found to be pos would do delivery at [facility] at 37 weeks.  - Her morning sickness stopped at 24 weeks. The patient has started having gastric reflux and has been using otc tums and rolaids with intermittent relief.  She drank a glass of milk on 5/24/22 and burped for 3 hours. I advised the patient to take prilosec for her gastric reflux and heartburn. Discussed prevacid as well vs daily qam 20mg prilosec. Reviewed the potential risks for chronic PPI in mom and in baby and very low risk outside of hypothetical so will try it  - The patient endorses fetal movement. She also notes swelling in her bilateral lower extremities, worst at night and alleviated by lying down. She works a desk job. I advised the patient to increase her movement and wear compression socks to alleviate the lower extremity edema.   No ctx, no HAs, no vision changes  Will see Dr. Lugo in 2 weeks and again at 35 weeks and then weekly with the amnio and growth in 2 weeks with M

## 2022-06-02 ENCOUNTER — VIRTUAL VISIT (OUTPATIENT)
Dept: MATERNAL FETAL MEDICINE | Facility: CLINIC | Age: 27
End: 2022-06-02
Attending: OBSTETRICS & GYNECOLOGY
Payer: COMMERCIAL

## 2022-06-02 DIAGNOSIS — O26.90 PREGNANCY RELATED CONDITION, ANTEPARTUM: ICD-10-CM

## 2022-06-02 DIAGNOSIS — O35.2XX0 HEREDITARY DISEASE IN FAMILY POSSIBLY AFFECTING FETUS, AFFECTING MANAGEMENT OF MOTHER IN PREGNANCY, SINGLE OR UNSPECIFIED FETUS: ICD-10-CM

## 2022-06-02 PROCEDURE — 999N000069 HC STATISTIC GENETIC COUNSELING, < 16 MIN: Mod: TEL,95 | Performed by: GENETIC COUNSELOR, MS

## 2022-06-02 NOTE — PROGRESS NOTES
[facility]  Genetic Counseling Consult    Patient:  Naomi Lemons YOB: 1995   Date of Service:  6/02/22      Naomi Lemons was evaluated via a billable telephone visit at [facility] for genetic counseling consultation as part of her upcoming appointment for ultrasound and amniocentesis procedure due to current pregnancy at 50% chance for retinoblastoma.     The patient has been notified of the following:    This telephone visit will be conducted via a call between you and your physician/provider. We have found that certain health care needs can be provided without the need for a physical exam. This service lets us provide the care you need with a short phone conversation. If a prescription is necessary we can send it directly to your pharmacy. If lab work is needed we can place an order for that and you can then stop by our lab to have the test done at a later time.     If during the course of the call the provider feels a telephone visit is not appropriate, you will not be charged for this service.     Impression/Plan:   The couple has met with genetic counseling earlier in this pregnancy, please see corresponding reports. Naomi's partner, Crow has a history of retinoblastoma with an identified RB1 germline variant which puts the current pregnancy at 50% risk. Naomi has elected to proceed with genetic amniocentesis which is scheduled for 6/7. 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 The performing laboratory already has parental samples coordinated by pediatric genetics. Results are expected within two weeks, and will be available in [employer].  We will contact her to discuss the results, and a copy will be forwarded to the office of the referring OB provider. The couple is planning to  further discuss plan of care for delivery and timing following these results.     Pregnancy History:   /Parity:    Age at Delivery: 27 year old  ANDREINA: 2022, by Last Menstrual Period  Gestational Age: 32w1d     Discussion:   Naomi's partner, Crow has a history of retinoblastoma with an identified pathogenic germline RB1 variant (R225X, [id number]). A copy of the letter from [facility] molecular diagnostics laboratory is in Crow's chart under lab from . This puts the current pregnancy at a 50% chance of also inheriting the RB1 variant. The couple has met with pediatric ophthalmology and MFM and are considering early delivery if pregnancy is found to have the familial variant.     Genetic Amniocentesis  - This is an invasive procedure typically performed at 15 weeks or later, through which amniotic fluid is obtained for the purpose of chromosome analysis and/or other prenatal genetic analysis.  - Amniocentesis is considered a diagnostic test for chromosome problems during pregnancy.  - The risk of complications including  delivery associated with amniocentesis is generally estimated to be 1/500.     We reviewed the amniocentesis procedure consent form as well as the genetic testing consent form. All questions were answered to her apparent satisfaction. Patient will plan to physically sign consent forms reviewed today on day of procedure. The following testing will be ordered on the prenatal sample: familial RB1 targeted variant through [facility] with backup culture held in our cytogenetics lab. The performing laboratory already has parental samples coordinated by pediatric genetics. Results are expected within two weeks, and will be available in [facility]. Further testing options including to assess chromosome conditions was discussed and declined.       We reviewed the benefits and limitations of this testing.  Screening tests provide a risk assessment specific to the  pregnancy for certain fetal chromosome abnormalities, but cannot definitively diagnose or exclude a fetal chromosome abnormality.  Follow-up genetic counseling and consideration of diagnostic testing is recommended with any abnormal screening result.     Diagnostic tests carry inherent risks- including risk of miscarriage- that require careful consideration.  These tests can detect fetal chromosome abnormalities with greater than 99% certainty.  Results can be compromised by maternal cell contamination or mosaicism, and are limited by the resolution of cytogenetic G-banding technology.  There is no screening nor diagnostic test that can detect all forms of birth defects or mental disability.    It was a pleasure to be involved with Naomi pierce care.       Phone call contact time  Call started at 2:30pm  Call ended at 2:45pm    Nikole Matta MS, [facility]  Licensed Genetic Counselor  [facility]  Maternal Fetal Medicine  Ph: [phone]  [email or web address]

## 2022-06-07 ENCOUNTER — OFFICE VISIT (OUTPATIENT)
Dept: MATERNAL FETAL MEDICINE | Facility: CLINIC | Age: 27
End: 2022-06-07
Attending: OBSTETRICS & GYNECOLOGY
Payer: COMMERCIAL

## 2022-06-07 ENCOUNTER — PREP FOR PROCEDURE (OUTPATIENT)
Dept: MATERNAL FETAL MEDICINE | Facility: CLINIC | Age: 27
End: 2022-06-07

## 2022-06-07 ENCOUNTER — TELEPHONE (OUTPATIENT)
Dept: MATERNAL FETAL MEDICINE | Facility: CLINIC | Age: 27
End: 2022-06-07

## 2022-06-07 ENCOUNTER — TELEPHONE (OUTPATIENT)
Dept: OBGYN | Facility: CLINIC | Age: 27
End: 2022-06-07

## 2022-06-07 ENCOUNTER — HOSPITAL ENCOUNTER (OUTPATIENT)
Dept: ULTRASOUND IMAGING | Facility: CLINIC | Age: 27
Discharge: HOME | End: 2022-06-07
Attending: OBSTETRICS & GYNECOLOGY
Payer: COMMERCIAL

## 2022-06-07 DIAGNOSIS — O35.2XX0 HEREDITARY DISEASE IN FAMILY POSSIBLY AFFECTING FETUS, AFFECTING MANAGEMENT OF MOTHER IN PREGNANCY, SINGLE OR UNSPECIFIED FETUS: Primary | ICD-10-CM

## 2022-06-07 DIAGNOSIS — O26.90 PREGNANCY RELATED CONDITION, ANTEPARTUM: Primary | ICD-10-CM

## 2022-06-07 DIAGNOSIS — O26.90 PREGNANCY RELATED CONDITION, ANTEPARTUM: ICD-10-CM

## 2022-06-07 DIAGNOSIS — O35.2XX0 HEREDITARY DISEASE IN FAMILY POSSIBLY AFFECTING FETUS, AFFECTING MANAGEMENT OF MOTHER IN PREGNANCY, SINGLE OR UNSPECIFIED FETUS: ICD-10-CM

## 2022-06-07 DIAGNOSIS — O99.212 OBESITY AFFECTING PREGNANCY IN SECOND TRIMESTER: ICD-10-CM

## 2022-06-07 PROCEDURE — 76816 OB US FOLLOW-UP PER FETUS: CPT | Mod: 26 | Performed by: OBSTETRICS & GYNECOLOGY

## 2022-06-07 PROCEDURE — 76819 FETAL BIOPHYS PROFIL W/O NST: CPT | Mod: 26 | Performed by: OBSTETRICS & GYNECOLOGY

## 2022-06-07 PROCEDURE — 76819 FETAL BIOPHYS PROFIL W/O NST: CPT

## 2022-06-07 PROCEDURE — 999N000069 HC STATISTIC GENETIC COUNSELING, < 16 MIN: Performed by: GENETIC COUNSELOR, MS

## 2022-06-07 RX ORDER — CARBOPROST TROMETHAMINE 250 UG/ML
250 INJECTION, SOLUTION INTRAMUSCULAR
Status: CANCELLED | OUTPATIENT
Start: 2022-06-07

## 2022-06-07 RX ORDER — MISOPROSTOL 200 UG/1
400 TABLET ORAL
Status: CANCELLED | OUTPATIENT
Start: 2022-06-07

## 2022-06-07 RX ORDER — TRANEXAMIC ACID 10 MG/ML
1 INJECTION, SOLUTION INTRAVENOUS EVERY 30 MIN PRN
Status: CANCELLED | OUTPATIENT
Start: 2022-06-07

## 2022-06-07 RX ORDER — CEFAZOLIN SODIUM 2 G/100ML
2 INJECTION, SOLUTION INTRAVENOUS
Status: CANCELLED | OUTPATIENT
Start: 2022-06-07

## 2022-06-07 RX ORDER — OXYTOCIN 10 [USP'U]/ML
10 INJECTION, SOLUTION INTRAMUSCULAR; INTRAVENOUS
Status: CANCELLED | OUTPATIENT
Start: 2022-06-07

## 2022-06-07 RX ORDER — LIDOCAINE 40 MG/G
CREAM TOPICAL
Status: CANCELLED | OUTPATIENT
Start: 2022-06-07

## 2022-06-07 RX ORDER — CEFAZOLIN SODIUM 2 G/100ML
2 INJECTION, SOLUTION INTRAVENOUS SEE ADMIN INSTRUCTIONS
Status: CANCELLED | OUTPATIENT
Start: 2022-06-07

## 2022-06-07 RX ORDER — OXYTOCIN/0.9 % SODIUM CHLORIDE 30/500 ML
100-340 PLASTIC BAG, INJECTION (ML) INTRAVENOUS CONTINUOUS PRN
Status: CANCELLED | OUTPATIENT
Start: 2022-06-07

## 2022-06-07 RX ORDER — SODIUM CHLORIDE, SODIUM LACTATE, POTASSIUM CHLORIDE, CALCIUM CHLORIDE 600; 310; 30; 20 MG/100ML; MG/100ML; MG/100ML; MG/100ML
INJECTION, SOLUTION INTRAVENOUS CONTINUOUS
Status: CANCELLED | OUTPATIENT
Start: 2022-06-07

## 2022-06-07 RX ORDER — OXYTOCIN/0.9 % SODIUM CHLORIDE 30/500 ML
340 PLASTIC BAG, INJECTION (ML) INTRAVENOUS CONTINUOUS PRN
Status: CANCELLED | OUTPATIENT
Start: 2022-06-07

## 2022-06-07 RX ORDER — ACETAMINOPHEN 325 MG/1
975 TABLET ORAL ONCE
Status: CANCELLED | OUTPATIENT
Start: 2022-06-07 | End: 2022-06-07

## 2022-06-07 RX ORDER — MISOPROSTOL 200 UG/1
800 TABLET ORAL
Status: CANCELLED | OUTPATIENT
Start: 2022-06-07

## 2022-06-07 RX ORDER — CITRIC ACID/SODIUM CITRATE 334-500MG
30 SOLUTION, ORAL ORAL
Status: CANCELLED | OUTPATIENT
Start: 2022-06-07

## 2022-06-07 RX ORDER — METHYLERGONOVINE MALEATE 0.2 MG/ML
200 INJECTION INTRAVENOUS
Status: CANCELLED | OUTPATIENT
Start: 2022-06-07

## 2022-06-07 NOTE — PROGRESS NOTES
Please see the imaging tab for details of the ultrasound performed today.    Aixa Rodríguez MD  Specialist in Maternal-Fetal Medicine

## 2022-06-07 NOTE — TELEPHONE ENCOUNTER
Pt scheduled for upcoming appts, notified of c/s date. Pt requests visitor exception for FOB assistance d/t his vision disability in NICU/L&D/OR suite. Will escalate.

## 2022-06-07 NOTE — PROGRESS NOTES
[facility]  Genetic Counseling Consult    Patient:  Naomi Lemons YOB: 1995   Date of Service:  6/07/22        Naomi Lemons was seen at the [facility]. The patient was accompanied by her partner, Crow to today's visit. Crow has a history of retinoblastoma with an identified RB1 germline variant which puts the current pregnancy at 50% risk. Naomi had an ultrasound today, see corresponding note for further details. She was initially planning to proceed with genetic amniocentesis today, however amniocentesis was unable to be performed due to fetal positioning and fluid level. The couple met with Dr. Rodríguez and have elected to pursue c/s delivery (due to breech positioning) at 37 weeks at [facility]. We will plan for collection of cord blood to be sent to [facility] in [place] for targeted RB1 testing for the familial variant. Specimen requirement is two EDTA tubes with 5mL cord blood.  Their lab has been updated with this plan. [facility] lab already has parental samples coordinated with our pediatric genetic counselor, Valarie. Consent for cord blood testing was obtained today and has been scanned into her chart. Orders will be placed and held in fetal chart. The patient will be contacted to discuss results as they become available. Naomi provided verbal permission for results to be left on her voicemail. Their infant will also undergo ophthalmology evaluation with Dr. Morales after delivery and she has been notified of this updated plan.     Naomi is scheduled for c/s on 7/6/22.     It was a pleasure to be involved with Naomi s care. Face-to-face time of the meeting was 10 minutes.      Nikole Matta MS, [facility]  Licensed Genetic Counselor  [facility]  Maternal Fetal Medicine  Ph: [phone]  [email or web address]

## 2022-06-07 NOTE — TELEPHONE ENCOUNTER
[facility] NATHALY Agustin calling to give update on plan of care in current pregnancy:  32w6d  [facility] today for an Amniocentesis for prenatal testing for RB1 mutation   Unable to perform amniocentesis d/t low fluid    The recommendations from [facility] today are for pt to transfer care to [facility] for recommended delivery via C/S at 37w , baby breech as well and to be delivered at [facility] for better infant evaluation at time of birth by Peds Ophthalmologist and Cord Blood banking plan at time of delivery.    Pt is on board w plan of care.    FYI Sent to Dr. Lugo  See  [facility] notes on imaging 6/7/22    Ginny Arredondo, RN on 6/7/2022 at 12:32 PM

## 2022-06-13 DIAGNOSIS — O26.90 PREGNANCY RELATED CONDITION, ANTEPARTUM: Primary | ICD-10-CM

## 2022-06-14 ENCOUNTER — OFFICE VISIT (OUTPATIENT)
Dept: MATERNAL FETAL MEDICINE | Facility: CLINIC | Age: 27
End: 2022-06-14
Attending: OBSTETRICS & GYNECOLOGY
Payer: COMMERCIAL

## 2022-06-14 ENCOUNTER — HOSPITAL ENCOUNTER (OUTPATIENT)
Dept: ULTRASOUND IMAGING | Facility: CLINIC | Age: 27
Discharge: HOME | End: 2022-06-14
Attending: OBSTETRICS & GYNECOLOGY
Payer: COMMERCIAL

## 2022-06-14 VITALS
SYSTOLIC BLOOD PRESSURE: 121 MMHG | WEIGHT: 242.9 LBS | OXYGEN SATURATION: 97 % | RESPIRATION RATE: 20 BRPM | HEART RATE: 114 BPM | DIASTOLIC BLOOD PRESSURE: 88 MMHG | BODY MASS INDEX: 43.03 KG/M2

## 2022-06-14 DIAGNOSIS — O26.90 PREGNANCY RELATED CONDITION, ANTEPARTUM: ICD-10-CM

## 2022-06-14 DIAGNOSIS — O99.713 PREGNANCY PRURITUS, THIRD TRIMESTER: ICD-10-CM

## 2022-06-14 DIAGNOSIS — O99.212 OBESITY AFFECTING PREGNANCY IN SECOND TRIMESTER: ICD-10-CM

## 2022-06-14 DIAGNOSIS — L29.9 PREGNANCY PRURITUS, THIRD TRIMESTER: ICD-10-CM

## 2022-06-14 DIAGNOSIS — O35.2XX0 HEREDITARY DISEASE IN FAMILY POSSIBLY AFFECTING FETUS, AFFECTING MANAGEMENT OF MOTHER IN PREGNANCY, SINGLE OR UNSPECIFIED FETUS: ICD-10-CM

## 2022-06-14 DIAGNOSIS — O09.93 SUPERVISION OF HIGH RISK PREGNANCY IN THIRD TRIMESTER: Primary | ICD-10-CM

## 2022-06-14 DIAGNOSIS — O26.90 PREGNANCY RELATED CONDITION, ANTEPARTUM: Primary | ICD-10-CM

## 2022-06-14 LAB
ALT SERPL W P-5'-P-CCNC: 30 U/L (ref 0–50)
AST SERPL W P-5'-P-CCNC: 16 U/L (ref 0–45)

## 2022-06-14 PROCEDURE — 99213 OFFICE O/P EST LOW 20 MIN: CPT | Mod: 25 | Performed by: ADVANCED PRACTICE MIDWIFE

## 2022-06-14 PROCEDURE — 36415 COLL VENOUS BLD VENIPUNCTURE: CPT | Performed by: ADVANCED PRACTICE MIDWIFE

## 2022-06-14 PROCEDURE — G0463 HOSPITAL OUTPT CLINIC VISIT: HCPCS | Mod: 25

## 2022-06-14 PROCEDURE — 76819 FETAL BIOPHYS PROFIL W/O NST: CPT

## 2022-06-14 PROCEDURE — 84450 TRANSFERASE (AST) (SGOT): CPT | Performed by: ADVANCED PRACTICE MIDWIFE

## 2022-06-14 PROCEDURE — 84460 ALANINE AMINO (ALT) (SGPT): CPT | Performed by: ADVANCED PRACTICE MIDWIFE

## 2022-06-14 PROCEDURE — 82239 BILE ACIDS TOTAL: CPT | Performed by: ADVANCED PRACTICE MIDWIFE

## 2022-06-14 PROCEDURE — 76819 FETAL BIOPHYS PROFIL W/O NST: CPT | Mod: 26 | Performed by: OBSTETRICS & GYNECOLOGY

## 2022-06-14 PROCEDURE — 76815 OB US LIMITED FETUS(S): CPT | Mod: 26 | Performed by: OBSTETRICS & GYNECOLOGY

## 2022-06-14 ASSESSMENT — PATIENT HEALTH QUESTIONNAIRE - PHQ9: SUM OF ALL RESPONSES TO PHQ QUESTIONS 1-9: 2

## 2022-06-14 NOTE — NURSING NOTE
Naomi seen in clinic today w/ FOB for BPP/NOB visit at 33w6d gestation for pregnancy complicated by family hx of retinoblastoma, borderline low BRADY (see report/notes). VSS. Pt reports positive, though somewhat decreased fetal movement x2d, see flowsheet. Provided education, return instructions, pt VU. Pt denies bldg/lof/change in discharge/contractions/headache/vision changes/chest pain/SOB/edema. New onset full body itching yesterday, no rash. Had changed laundry soap 2 wks ago, no improvement in itching with shower, new clothing (not laundered in that soap), lotion, zyrtec. Folder reviewed, return precautions/card provided. Karo Rosa CNM met with pt and discussed POC. Plan for weekly f/u until delivery at 37 wks for optho eval/early intervention if indicated.  genetic testing previously arranged. Future visits previously scheduled. Pt discharged stable and ambulatory to lab.

## 2022-06-14 NOTE — PROGRESS NOTES
Please refer to ultrasound report under 'Imaging' Studies of 'Chart Review' tabs.    Liu Kruse M.D.

## 2022-06-14 NOTE — PROGRESS NOTES
"Maternal fetal Medicine OB Follow up visit.     Naomi Lemons  : 1995  MRN: [id number]    CC: OB Follow-up    Subjective:  Naomi Lemons is a 27 year old  at 33w6d presenting for routine OB follow-up. Today, she is here with her , Crow, and reports feeling well. She has been feeling occasional BH contractions, which she describes as tightening and some pelvic pressure. Nothing painful or consistent. Denies loss of fluid or vaginal bleeding.  Reports fetal movement.      Last evening, she did experience itching, which started on the backs of her hands, moved to arms, legs, torso, and right eye. She took a Zyrtec and also used Eucerin lotion but does not think she saw much improvement in the itching. She did recently change laundry detergent, but that was about 2 weeks ago. Reports the itching has improved today, but is still present.    She has a written birth plan with her today and is wondering what the visitor policy is as Crow is blind and will need assistance navigating the hospital if he is  from Naomi.    OB Hx:  OB History    Para Term  AB Living   1 0 0 0 0 0   SAB IAB Ectopic Multiple Live Births   0 0 0 0 0      # Outcome Date GA Lbr Isac/2nd Weight Sex Delivery Anes PTL Lv   1 Current                  Objective:    Gen: alert, oriented, NAD  Skin: warm, dry, intact  Respiratory: breathing unlabored, no SOB  Abdominal: gravid, non-tender  Pelvic: deferred  Extremities: WNL  Psych: mood WNL, behavior WNL      OB Ultrasound:  Please see \"imaging\" tab under chart review for today's ultrasound results.      Assessment/Plan:  27 year old  at 33w6d here for follow OB visit.    Pregnancy has been complicated by:   Fetal dx:  - FOB with retinoblastoma - 50% chance of fetus being affected  - Breech at 33wks    Maternal dx:  - Fibromyalgia  - Anxitey  - Obesity   - Pruritus in pregnancy        #Retinoblastoma:  - Previously counseled (See GC visit on " 3/4) on inheritance risk.  - Unable to preform amniocentesis due to subjectively low fluid and fetal position.     #Pruritis in pregnancy:  - Will check LFTs and bile acids to assess for cholestasis of pregnancy. LFTs WNL; bile acids pending.  - Continue with daily antihistamine.  - Discussed returning to previous detergent.  - Can trial hydrocortisone cream/colloidal oatmeal cream to see if that is helpful in symptoms.    #Breech:  - Has upcoming visit with chiropractor.   - Reviewed various stretches and maneuvers to help assist with optimal fetal positioning.     #Routine PNC:  - Prenatal labs:  Rh: +  Antibody: neg   HepB/HIV/RPR/HepC: nonreactive   Rubella: immune     GCT: 154  GTT: 76, 189, 153, 105   UC: <10,000 CFU/mL Urogenital renu  - Pap smear: UTD  - Immunizations:  s/p TDap, Covid x2  - Genetic screening: low-risk NIPS    # Surveillance:  - Serial growth US. Next on   - Weekly BPPs    #Delivery planning:  - Primary c/s schedule at 37 weeks if persistently breech. If fetus becomes cephalic, will transition c/s to IOL.  - Feeding: breastfeeding  - Contraception plans: needs to be discussed    RTC in 1 week    20 minutes spent on the date of the encounter, doing chart review, history and exam, documentation and further activities as noted.      Karo Rosa CNM on 2022 at 8:48 AM

## 2022-06-15 LAB — BILE AC SERPL-SCNC: 4 UMOL/L

## 2022-06-21 ENCOUNTER — LAB (OUTPATIENT)
Dept: LAB | Facility: CLINIC | Age: 27
End: 2022-06-21
Attending: OBSTETRICS & GYNECOLOGY
Payer: COMMERCIAL

## 2022-06-21 ENCOUNTER — TELEPHONE (OUTPATIENT)
Dept: MIDWIFE SERVICES | Facility: CLINIC | Age: 27
End: 2022-06-21

## 2022-06-21 ENCOUNTER — HOSPITAL ENCOUNTER (OUTPATIENT)
Dept: ULTRASOUND IMAGING | Facility: CLINIC | Age: 27
Discharge: HOME | End: 2022-06-21
Attending: OBSTETRICS & GYNECOLOGY
Payer: COMMERCIAL

## 2022-06-21 ENCOUNTER — OFFICE VISIT (OUTPATIENT)
Dept: MATERNAL FETAL MEDICINE | Facility: CLINIC | Age: 27
End: 2022-06-21
Attending: OBSTETRICS & GYNECOLOGY
Payer: COMMERCIAL

## 2022-06-21 VITALS
RESPIRATION RATE: 18 BRPM | BODY MASS INDEX: 43.33 KG/M2 | HEART RATE: 97 BPM | SYSTOLIC BLOOD PRESSURE: 122 MMHG | DIASTOLIC BLOOD PRESSURE: 85 MMHG | OXYGEN SATURATION: 100 % | WEIGHT: 244.6 LBS

## 2022-06-21 DIAGNOSIS — O99.713 PREGNANCY PRURITUS, THIRD TRIMESTER: ICD-10-CM

## 2022-06-21 DIAGNOSIS — L29.9 PREGNANCY PRURITUS, THIRD TRIMESTER: ICD-10-CM
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
## 2022-06-21 DIAGNOSIS — O35.2XX0 HEREDITARY DISEASE IN FAMILY POSSIBLY AFFECTING FETUS, AFFECTING MANAGEMENT OF MOTHER IN PREGNANCY, SINGLE OR UNSPECIFIED FETUS: Primary | ICD-10-CM

## 2022-06-21 DIAGNOSIS — O35.2XX0 HEREDITARY DISEASE IN FAMILY POSSIBLY AFFECTING FETUS, AFFECTING MANAGEMENT OF MOTHER IN PREGNANCY, SINGLE OR UNSPECIFIED FETUS: ICD-10-CM

## 2022-06-21 DIAGNOSIS — O09.93 SUPERVISION OF HIGH RISK PREGNANCY IN THIRD TRIMESTER: Primary | ICD-10-CM

## 2022-06-21 DIAGNOSIS — O99.212 OBESITY AFFECTING PREGNANCY IN SECOND TRIMESTER: ICD-10-CM

## 2022-06-21 LAB
ALT SERPL W P-5'-P-CCNC: 24 U/L (ref 0–50)
AST SERPL W P-5'-P-CCNC: 15 U/L (ref 0–45)

## 2022-06-21 PROCEDURE — 87653 STREP B DNA AMP PROBE: CPT | Performed by: ADVANCED PRACTICE MIDWIFE

## 2022-06-21 PROCEDURE — 76819 FETAL BIOPHYS PROFIL W/O NST: CPT | Mod: 26 | Performed by: OBSTETRICS & GYNECOLOGY

## 2022-06-21 PROCEDURE — 84460 ALANINE AMINO (ALT) (SGPT): CPT

## 2022-06-21 PROCEDURE — 82239 BILE ACIDS TOTAL: CPT

## 2022-06-21 PROCEDURE — G0463 HOSPITAL OUTPT CLINIC VISIT: HCPCS | Mod: 25

## 2022-06-21 PROCEDURE — 99213 OFFICE O/P EST LOW 20 MIN: CPT | Mod: 25 | Performed by: ADVANCED PRACTICE MIDWIFE

## 2022-06-21 PROCEDURE — 76815 OB US LIMITED FETUS(S): CPT | Mod: 26 | Performed by: OBSTETRICS & GYNECOLOGY

## 2022-06-21 PROCEDURE — 84450 TRANSFERASE (AST) (SGOT): CPT

## 2022-06-21 PROCEDURE — 36415 COLL VENOUS BLD VENIPUNCTURE: CPT

## 2022-06-21 PROCEDURE — 76819 FETAL BIOPHYS PROFIL W/O NST: CPT

## 2022-06-21 RX ORDER — HYDROXYZINE PAMOATE 50 MG/1
50-100 CAPSULE ORAL 3 TIMES DAILY PRN
Qty: 50 CAPSULE | Refills: 0 | Status: ON HOLD | OUTPATIENT
Start: 2022-06-21 | End: 2022-07-08

## 2022-06-21 NOTE — PROGRESS NOTES
"Maternal fetal Medicine OB Follow up visit.     Naomi Lemons  : 1995  MRN: [id number]    CC: OB Follow-up    Subjective:  Naomi Lemons is a 27 year old  at 34w6d presenting for routine OB follow-up. Today, she is feeling fine. She is pretty tired as she was up most of the night due to itching. Also thinks the benadryl made her alert rather than drowsy. The itching continues to be mostly on her arms, legs, and around her eyes. She has also tried daily Zyrtec, which she does feel is helpful.      Is feeling occasional BH contractions, denies loss of fluid or vaginal bleeding.  Reports fetal movement.       OB Hx:  OB History    Para Term  AB Living   1 0 0 0 0 0   SAB IAB Ectopic Multiple Live Births   0 0 0 0 0      # Outcome Date GA Lbr Isac/2nd Weight Sex Delivery Anes PTL Lv   1 Current                  Objective:  /85 (BP Location: Right arm, Patient Position: Sitting, Cuff Size: Adult Large)   Pulse 97   Resp 18   Wt 110.9 kg (244 lb 9.6 oz)   LMP 10/20/2021   SpO2 100%   BMI 43.33 kg/m    Gen: alert, oriented, NAD  Skin: warm, dry, intact  Respiratory: breathing unlabored, no SOB  Abdominal: gravid, non-tender  Pelvic: deferred  Extremities: WNL  Psych: mood WNL, behavior WNL      OB Ultrasound:  Please see \"imaging\" tab under chart review for today's ultrasound results.      Assessment/Plan:  27 year old  at 34w6d here for follow OB visit.    Pregnancy has been complicated by:   Fetal dx:  - FOB with retinoblastoma - 50% chance of fetus being affected  - Breech at 33wks     Maternal dx:  - Fibromyalgia  - Anxitey  - Obesity   - Pruritus in pregnancy           #Retinoblastoma:  - Previously counseled (See GC visit on 3/4) on inheritance risk.  - Unable to preform amniocentesis due to subjectively low fluid and fetal position.      #Pruritis in pregnancy:  - LFTs and Bile acids WNL on 22. Plan to recheck again today due to ongoing itching.  - " Continue with daily antihistamine.  - Will send prescription for vistaril PRN.     #Breech:  - Has upcoming visit with chiropractor.   - Reviewed various stretches and maneuvers to help assist with optimal fetal positioning.      #Routine PNC:  - Prenatal labs:  Rh: +  Antibody: neg               HepB/HIV/RPR/HepC: nonreactive               Rubella: immune                 GCT: 154  GTT: 76, 189, 153, 105               UC: <10,000 CFU/mL Urogenital renu  - Pap smear: UTD  - Immunizations:  s/p TDap, Covid x2  - Genetic screening: low-risk NIPS     # Surveillance:  - Serial growth US. Next on   - Weekly BPPs     #Delivery planning:  - Primary c/s schedule at 37 weeks if persistently breech. If fetus becomes cephalic, will transition c/s to IOL.  - Feeding: breastfeeding  - Contraception plans: needs to be discussed    20 minutes spent on the date of the encounter, doing chart review, history and exam, documentation and further activities as noted.      Karo Rosa CNM on 2022 at 8:42 AM

## 2022-06-21 NOTE — PROGRESS NOTES
"Please see \"Imaging\" tab under \"Chart Review\" for details of today's US.    Adrienne Wagner, DO    "

## 2022-06-21 NOTE — NURSING NOTE
Naomi seen in clinic today for BPP/DIGNA visit (added on at pt request) at 34w6d gestation for pregnancy complicated by likely  (see report/notes). VSS. Pt reports positive fetal movement, see flowsheet. Pt denies bldg/lof/change in discharge/contractions/headache/vision changes/chest pain/SOB/edema. Ongoing total body itching unrelieved by zyrtec/benadryl at home. Pt has short term disability paperwork today. Karo Rosa CNM met with pt and discussed POC. GBS collected today. Plan for weekly OBV/BPPs until delivery. Future visits scheduled at . Pt discharged stable and ambulatory to lab.

## 2022-06-22 LAB
BILE AC SERPL-SCNC: 7 UMOL/L
GP B STREP DNA SPEC QL NAA+PROBE: NEGATIVE

## 2022-06-22 NOTE — TELEPHONE ENCOUNTER
S: Received call from Naomi about a fall today. She fell on her knees and braced herself with her hands. She thinks she may have hit her abdomen with her knees when she fell but did not feel any pain, so unsure. She is 34w6d. She has been feeling baby move. She has no abdominal tenderness. She is not having any bleeding or vaginal discharge concerns.   A/P:   Fall in pregnancy.   Monitor fetal movement. If you have any concerns please call back to triage or come in for evaluation. Same if any abdominal tenderness/contractions or with any vaginal bleeding.   Naomi verbalizes understanding of the plan.  Camille Matthew CNM

## 2022-06-28 ENCOUNTER — HOSPITAL ENCOUNTER (OUTPATIENT)
Dept: ULTRASOUND IMAGING | Facility: CLINIC | Age: 27
Discharge: HOME | End: 2022-06-28
Attending: OBSTETRICS & GYNECOLOGY
Payer: COMMERCIAL

## 2022-06-28 ENCOUNTER — OFFICE VISIT (OUTPATIENT)
Dept: MATERNAL FETAL MEDICINE | Facility: CLINIC | Age: 27
End: 2022-06-28
Attending: OBSTETRICS & GYNECOLOGY
Payer: COMMERCIAL

## 2022-06-28 VITALS
WEIGHT: 244 LBS | DIASTOLIC BLOOD PRESSURE: 70 MMHG | HEART RATE: 119 BPM | BODY MASS INDEX: 43.22 KG/M2 | OXYGEN SATURATION: 97 % | SYSTOLIC BLOOD PRESSURE: 115 MMHG | RESPIRATION RATE: 18 BRPM

## 2022-06-28 DIAGNOSIS — O99.212 OBESITY AFFECTING PREGNANCY IN SECOND TRIMESTER: ICD-10-CM

## 2022-06-28 DIAGNOSIS — O09.93 SUPERVISION OF HIGH RISK PREGNANCY IN THIRD TRIMESTER: Primary | ICD-10-CM

## 2022-06-28 DIAGNOSIS — O35.2XX0 HEREDITARY DISEASE IN FAMILY POSSIBLY AFFECTING FETUS, AFFECTING MANAGEMENT OF MOTHER IN PREGNANCY, SINGLE OR UNSPECIFIED FETUS: ICD-10-CM

## 2022-06-28 PROCEDURE — 76816 OB US FOLLOW-UP PER FETUS: CPT | Mod: 26 | Performed by: OBSTETRICS & GYNECOLOGY

## 2022-06-28 PROCEDURE — G0463 HOSPITAL OUTPT CLINIC VISIT: HCPCS | Mod: 25

## 2022-06-28 PROCEDURE — 76819 FETAL BIOPHYS PROFIL W/O NST: CPT

## 2022-06-28 PROCEDURE — 99213 OFFICE O/P EST LOW 20 MIN: CPT | Mod: 25 | Performed by: ADVANCED PRACTICE MIDWIFE

## 2022-06-28 PROCEDURE — 76819 FETAL BIOPHYS PROFIL W/O NST: CPT | Mod: 26 | Performed by: OBSTETRICS & GYNECOLOGY

## 2022-06-28 ASSESSMENT — PAIN SCALES - GENERAL: PAINLEVEL: NO PAIN (0)

## 2022-06-28 NOTE — NURSING NOTE
Naomi seen in clinic today for prenatal visit, follow up growth US, BPP at 35w6d gestation (see report/notes). VSS. Pulse tachy at 119. Pt states she has sinus tachycardia. Denies CP/SOB/chest tightness. Pt denies bldg/lof/change in discharge/contractions/headache/vision changes/edema. Karo Rosa CNM met with pt and discussed POC. Dr. Wagner reviewed US. Plan: c/s on 7/6 0830, breech. Pt scheduled to return to [facility] on 7/5 for BPP/prenatal visit. Plan to do Covid test and pre op labs. Pt discharged stable and ambulatory.     Stacy Philip RN

## 2022-06-28 NOTE — PROGRESS NOTES
"Maternal fetal Medicine OB Follow up visit.     Naomi Lemons  : 1995  MRN: [id number]    CC: OB Follow-up    Subjective:  Naomi Lemons is a 27 year old  at 35w6d presenting for routine OB follow-up. Today, she is here with Crow, and reports feeling well. Reports itching has gotten better and she has been able to sleep.     Patient feeling occasional BH contractions, denies loss of fluid or vaginal bleeding.  Reports fetal movement.      She has questions about expectations with a typical c/s.      OB Hx:  OB History    Para Term  AB Living   1 0 0 0 0 0   SAB IAB Ectopic Multiple Live Births   0 0 0 0 0      # Outcome Date GA Lbr Isac/2nd Weight Sex Delivery Anes PTL Lv   1 Current                  Objective:  /70 (BP Location: Left arm, Patient Position: Chair)   Pulse 119   Resp 18   Wt 110.7 kg (244 lb)   LMP 10/20/2021   SpO2 97%   BMI 43.22 kg/m      Gen: alert, oriented, NAD  Skin: warm, dry, intact  Respiratory: breathing unlabored, no SOB  Abdominal: gravid, non-tender  Pelvic: deferred  Extremities: WNL  Psych: mood WNL, behavior WNL      OB Ultrasound:  Please see \"imaging\" tab under chart review for today's ultrasound results.      Assessment/Plan:  27 year old  at 35w6d here for follow OB visit.    Pregnancy has been complicated by:   Fetal dx:  - FOB with retinoblastoma - 50% chance of fetus being affected  - Breech at 33wks     Maternal dx:  - Fibromyalgia  - Anxitey  - Obesity   - Pruritus in pregnancy           #Retinoblastoma:  - Previously counseled (See GC visit on 3/4) on inheritance risk.  - Unable to preform amniocentesis due to subjectively low fluid and fetal position.      #Pruritis in pregnancy:  - LFTs and Bile acids WNL on  and .   - Continue with daily antihistamine.     #Breech:  - Continue with chiropractic care to assist with possible rotation, however has been persistently breech for the past month.   - Reviewed " various stretches and maneuvers to help assist with optimal fetal positioning.      #Routine PNC:  - Prenatal labs:  Rh: +  Antibody: neg               HepB/HIV/RPR/HepC: nonreactive               Rubella: immune                 GCT: 154  GTT: 76, 189, 153, 105               UC: <10,000 CFU/mL Urogenital renu  - Pap smear: UTD  - Immunizations:  s/p TDap, Covid x2  - Genetic screening: low-risk NIPS     # Surveillance:  - Serial growth US. EFW today of 17%  - Weekly BPPs     #Delivery planning:  - Primary c/s schedule at 37 weeks if persistently breech. If fetus becomes cephalic, will transition c/s to IOL.  - Discussed what a typical c/s entails.  - Partner, Crow, has severe visual limitations due to retinoblastoma and will need additional assistance from his and/or Naomi's mom during surgery, recovery, and in the NICU if Naomi is unable to join him.  - Feeding: breastfeeding as able  - Contraception plans: needs to be discussed    RTC in 1 week    20 minutes spent on the date of the encounter, doing chart review, history and exam, documentation and further activities as noted.      Karo Rosa CNM on 2022 at 9:03 AM

## 2022-07-01 ENCOUNTER — ANESTHESIA EVENT (OUTPATIENT)
Dept: OBGYN | Facility: CLINIC | Age: 27
End: 2022-07-01
Payer: COMMERCIAL

## 2022-07-01 NOTE — ANESTHESIA PREPROCEDURE EVALUATION
Anesthesia Pre-Procedure Evaluation    Patient: Naomi Lemons   MRN: [id number] : 1995        Procedure : Procedure(s):   SECTION          Past Medical History:   Diagnosis Date     Anxiety      Asthma      COVID-19      Depressive disorder      Fibromyalgia      Obesity      PTSD (post-traumatic stress disorder)      Vitamin D deficiency       Past Surgical History:   Procedure Laterality Date     ANTERIOR CRUCIATE LIGAMENT REPAIR       POLYPECTOMY       TONSILLECTOMY       wisdom teeth        Allergies   Allergen Reactions     Gluten Meal      vomiting      Social History     Tobacco Use     Smoking status: Never Smoker     Smokeless tobacco: Never Used   Substance Use Topics     Alcohol use: Not Currently      Wt Readings from Last 1 Encounters:   22 110.7 kg (244 lb)        Anesthesia Evaluation   Pt has had prior anesthetic. Type: MAC.    No history of anesthetic complications       ROS/MED HX  ENT/Pulmonary:     (+) asthma     Neurologic: Comment: Father of baby has retinoblastoma (with very limited vision)      Cardiovascular:  - neg cardiovascular ROS     METS/Exercise Tolerance:     Hematologic:  - neg hematologic  ROS     Musculoskeletal: Comment: Fibromyalgia      GI/Hepatic:  - neg GI/hepatic ROS     Renal/Genitourinary:  - neg Renal ROS     Endo:  - neg endo ROS   (+) Obesity,     Psychiatric/Substance Use: Comment: PTSD    (+) psychiatric history anxiety and depression     Infectious Disease:  - neg infectious disease ROS     Malignancy:  - neg malignancy ROS     Other:   Breech  (-) previous  and TOLAC candidate          OUTSIDE LABS:  CBC:   Lab Results   Component Value Date    WBC 8.0 05/10/2022    WBC 8.8 2022    HGB 12.0 05/10/2022    HGB 13.7 2022    HCT 37.0 05/10/2022    HCT 41.8 2022     05/10/2022     2022     BMP:   Lab Results   Component Value Date     2019    POTASSIUM 3.2 (L) 2019    CHLORIDE  108 04/17/2019    CO2 24 04/17/2019    BUN 13 04/17/2019    CR 0.58 04/17/2019    GLC 94 04/17/2019     COAGS: No results found for: PTT, INR, FIBR  POC:   Lab Results   Component Value Date    HCG Negative 04/17/2019     HEPATIC:   Lab Results   Component Value Date    ALT 24 06/21/2022    AST 15 06/21/2022     OTHER:   Lab Results   Component Value Date    MANUELA 8.8 04/17/2019    MAG 2.1 04/17/2019    TSH 2.70 04/17/2019       Anesthesia Plan    ASA Status:  2   NPO Status:  ELEVATED Aspiration Risk/Unknown    Anesthesia Type: Spinal.   Induction: N/a.   Maintenance: N/A.   Techniques and Equipment:       - Blood: T&S     Drips/Meds: Oxytocin, Phenylephrine.     Consents    Anesthesia Plan(s) and associated risks, benefits, and realistic alternatives discussed. Questions answered and patient/representative(s) expressed understanding.     - Discussed: Risks, Benefits and Alternatives for BOTH SEDATION and the PROCEDURE were discussed     - Discussed with:  Patient      - Extended Intubation/Ventilatory Support Discussed: No.      - Patient is DNR/DNI Status: No    Use of blood products discussed: Yes.     - Discussed with: Patient.     - Consented: consented to blood products            Reason for refusal: other.     Postoperative Care    Pain management: Multi-modal analgesia, intrathecal morphine, Neuraxial analgesia, Peripheral nerve block (Single Shot).   PONV prophylaxis: Ondansetron (or other 5HT-3)     Comments:           neg OB ROS.       lAexandro Daigle MD

## 2022-07-05 ENCOUNTER — OFFICE VISIT (OUTPATIENT)
Dept: MATERNAL FETAL MEDICINE | Facility: CLINIC | Age: 27
End: 2022-07-05
Attending: OBSTETRICS & GYNECOLOGY
Payer: COMMERCIAL

## 2022-07-05 ENCOUNTER — HOSPITAL ENCOUNTER (OUTPATIENT)
Dept: ULTRASOUND IMAGING | Facility: CLINIC | Age: 27
Discharge: HOME | End: 2022-07-05
Attending: OBSTETRICS & GYNECOLOGY
Payer: COMMERCIAL

## 2022-07-05 VITALS
OXYGEN SATURATION: 98 % | BODY MASS INDEX: 43.22 KG/M2 | HEART RATE: 85 BPM | WEIGHT: 244 LBS | RESPIRATION RATE: 18 BRPM

## 2022-07-05 DIAGNOSIS — O35.2XX0 HEREDITARY DISEASE IN FAMILY POSSIBLY AFFECTING FETUS, AFFECTING MANAGEMENT OF MOTHER IN PREGNANCY, SINGLE OR UNSPECIFIED FETUS: ICD-10-CM

## 2022-07-05 DIAGNOSIS — O35.2XX0 HEREDITARY DISEASE IN FAMILY POSSIBLY AFFECTING FETUS, AFFECTING MANAGEMENT OF MOTHER IN PREGNANCY, SINGLE OR UNSPECIFIED FETUS: Primary | ICD-10-CM

## 2022-07-05 DIAGNOSIS — O09.93 SUPERVISION OF HIGH RISK PREGNANCY IN THIRD TRIMESTER: Primary | ICD-10-CM

## 2022-07-05 DIAGNOSIS — O99.212 OBESITY AFFECTING PREGNANCY IN SECOND TRIMESTER: ICD-10-CM

## 2022-07-05 LAB
ABO/RH(D): NORMAL
ANTIBODY SCREEN: NEGATIVE
ERYTHROCYTE [DISTWIDTH] IN BLOOD BY AUTOMATED COUNT: 12.9 % (ref 10–15)
HCT VFR BLD AUTO: 38.3 % (ref 35–47)
HGB BLD-MCNC: 12.9 G/DL (ref 11.7–15.7)
MCH RBC QN AUTO: 27.1 PG (ref 26.5–33)
MCHC RBC AUTO-ENTMCNC: 33.7 G/DL (ref 31.5–36.5)
MCV RBC AUTO: 81 FL (ref 78–100)
PLATELET # BLD AUTO: 269 10E3/UL (ref 150–450)
RBC # BLD AUTO: 4.76 10E6/UL (ref 3.8–5.2)
SARS-COV-2 RNA RESP QL NAA+PROBE: NEGATIVE
SPECIMEN EXPIRATION DATE: NORMAL
T PALLIDUM AB SER QL: NONREACTIVE
WBC # BLD AUTO: 9.2 10E3/UL (ref 4–11)

## 2022-07-05 PROCEDURE — U0003 INFECTIOUS AGENT DETECTION BY NUCLEIC ACID (DNA OR RNA); SEVERE ACUTE RESPIRATORY SYNDROME CORONAVIRUS 2 (SARS-COV-2) (CORONAVIRUS DISEASE [COVID-19]), AMPLIFIED PROBE TECHNIQUE, MAKING USE OF HIGH THROUGHPUT TECHNOLOGIES AS DESCRIBED BY CMS-2020-01-R: HCPCS | Performed by: ADVANCED PRACTICE MIDWIFE

## 2022-07-05 PROCEDURE — 86780 TREPONEMA PALLIDUM: CPT | Performed by: OBSTETRICS & GYNECOLOGY

## 2022-07-05 PROCEDURE — 99213 OFFICE O/P EST LOW 20 MIN: CPT | Mod: 25 | Performed by: ADVANCED PRACTICE MIDWIFE

## 2022-07-05 PROCEDURE — 76815 OB US LIMITED FETUS(S): CPT | Mod: 26 | Performed by: OBSTETRICS & GYNECOLOGY

## 2022-07-05 PROCEDURE — 76819 FETAL BIOPHYS PROFIL W/O NST: CPT | Mod: 26 | Performed by: OBSTETRICS & GYNECOLOGY

## 2022-07-05 PROCEDURE — 86901 BLOOD TYPING SEROLOGIC RH(D): CPT | Performed by: OBSTETRICS & GYNECOLOGY

## 2022-07-05 PROCEDURE — 36415 COLL VENOUS BLD VENIPUNCTURE: CPT | Performed by: OBSTETRICS & GYNECOLOGY

## 2022-07-05 PROCEDURE — 85027 COMPLETE CBC AUTOMATED: CPT | Performed by: OBSTETRICS & GYNECOLOGY

## 2022-07-05 PROCEDURE — 76819 FETAL BIOPHYS PROFIL W/O NST: CPT

## 2022-07-05 PROCEDURE — G0463 HOSPITAL OUTPT CLINIC VISIT: HCPCS | Mod: 25

## 2022-07-05 NOTE — NURSING NOTE
Naomi seen in clinic today for OB visit at 36w6d gestation for pregnancy complicated by FOB w/ retinoblastoma (see report/notes). VSS. Pt reports positive fetal movement, see flowsheet. Pt denies bldg/lof/change in discharge/contractions/headache/vision changes/chest pain/SOB/edema. Karo Leon CNM met with pt and discussed POC. Plan for c/s tomorrow, preop instructions reviewed. Preop drink/soap provided. COVID swab obtained Pt discharged stable and ambulatory to lab.    Pts birth plan available in media tab- pt wishes to decline Hep B vaccine for infant, erythromycin ointment ok w/ parents.

## 2022-07-05 NOTE — PROGRESS NOTES
"Maternal fetal Medicine OB Follow up visit.     Naomi Lemons  : 1995  MRN: [id number]    CC: OB Follow-up    Subjective:  Naomi Lemons is a 27 year old  at 36w6d presenting for routine OB follow-up. She is here with Crow and they are getting ready for the upcoming c/s tomorrow.     Patient denies regular, painful contractions, denies loss of fluid or vaginal bleeding.  Reports fetal movement.      She has some questions about restrictions after surgery, birth control, and where to go for her postpartum visit.    OB Hx:  OB History    Para Term  AB Living   1 0 0 0 0 0   SAB IAB Ectopic Multiple Live Births   0 0 0 0 0      # Outcome Date GA Lbr Isac/2nd Weight Sex Delivery Anes PTL Lv   1 Current                  Objective:  Pulse 85   Resp 18   Wt 110.7 kg (244 lb)   LMP 10/20/2021   SpO2 98%   BMI 43.22 kg/m      Gen: alert, oriented, NAD  Skin: warm, dry, intact  Respiratory: breathing unlabored, no SOB  Abdominal: gravid, non-tender  Pelvic: deferred  Extremities: WNL  Psych: mood WNL, behavior WNL      OB Ultrasound:  Please see \"imaging\" tab under chart review for today's ultrasound results.      Assessment/Plan:  27 year old  at 36w6d here for follow OB visit.    Pregnancy has been complicated by:   Fetal dx:  - FOB with retinoblastoma - 50% chance of fetus being affected  - Breech at 33wks     Maternal dx:  - Fibromyalgia  - Anxitey  - Obesity   - Pruritus in pregnancy           #Retinoblastoma:  - Previously counseled (See GC visit on 3/4) on inheritance risk.  - Unable to preform amniocentesis due to subjectively low fluid and fetal position.      #Pruritis in pregnancy:  - LFTs and Bile acids WNL on  and .   - Continue with daily antihistamine.     #Breech:  - s/p visits with chiropractic care, which were unsuccessful. Plan for primary c/s.      #Routine PNC:  - Prenatal labs:  Rh: +  Antibody: neg               HepB/HIV/RPR/HepC: " nonreactive               Rubella: immune                 GCT: 154  GTT: 76, 189, 153, 105               UC: <10,000 CFU/mL Urogenital renu  - Pap smear: UTD  - Immunizations:  s/p TDap, Covid x2  - Genetic screening: low-risk NIPS     # Surveillance:  - Serial growth US. EFW today of 17%  - Weekly BPPs     #Delivery planning:  - Primary c/s schedule at 37 weeks if persistently breech. If fetus becomes cephalic, will transition c/s to IOL.  - Discussed what a typical c/s entails.  - Partner, Crow, has severe visual limitations due to retinoblastoma and will need additional assistance from his and/or Naomi's mom during surgery, recovery, and in the NICU if Naomi is unable to join him.  - Feeding: breastfeeding as able  - Contraception plans: previously used Nexplanon without issue. May return to that method, but is also interested in the copper IUD. cousneled on r/b/a of various forms of contraception. Reviewed that currently in [facility] clinic, we do not have LARCs for insertion and this would need to be done at her primary OBs office.  - Postpartum visit at 2 and 6 weeks, can occur at [facility] or primary OB office.       20 minutes spent on the date of the encounter, doing chart review, history and exam, documentation and further activities as noted.      Karo Rosa CNM on 2022 at 9:45 AM

## 2022-07-06 ENCOUNTER — ANESTHESIA (OUTPATIENT)
Dept: OBGYN | Facility: CLINIC | Age: 27
End: 2022-07-06
Payer: COMMERCIAL

## 2022-07-06 ENCOUNTER — HOSPITAL ENCOUNTER (INPATIENT)
Facility: CLINIC | Age: 27
LOS: 3 days | Discharge: HOME | End: 2022-07-09
Attending: OBSTETRICS & GYNECOLOGY | Admitting: OBSTETRICS & GYNECOLOGY
Payer: COMMERCIAL

## 2022-07-06 DIAGNOSIS — F32.5 MAJOR DEPRESSIVE DISORDER WITH SINGLE EPISODE, IN REMISSION (H): ICD-10-CM

## 2022-07-06 DIAGNOSIS — Z98.891 S/P C-SECTION: Primary | ICD-10-CM

## 2022-07-06 LAB
HGB BLD-MCNC: 12 G/DL (ref 11.7–15.7)
T PALLIDUM AB SER QL: NONREACTIVE

## 2022-07-06 PROCEDURE — 36415 COLL VENOUS BLD VENIPUNCTURE: CPT | Performed by: OBSTETRICS & GYNECOLOGY

## 2022-07-06 PROCEDURE — 710N000010 HC RECOVERY PHASE 1, LEVEL 2, PER MIN: Performed by: OBSTETRICS & GYNECOLOGY

## 2022-07-06 PROCEDURE — 258N000003 HC RX IP 258 OP 636: Performed by: OBSTETRICS & GYNECOLOGY

## 2022-07-06 PROCEDURE — C9290 INJ, BUPIVACAINE LIPOSOME: HCPCS | Performed by: ANESTHESIOLOGY

## 2022-07-06 PROCEDURE — 250N000013 HC RX MED GY IP 250 OP 250 PS 637: Performed by: OBSTETRICS & GYNECOLOGY

## 2022-07-06 PROCEDURE — 272N000001 HC OR GENERAL SUPPLY STERILE: Performed by: OBSTETRICS & GYNECOLOGY

## 2022-07-06 PROCEDURE — 250N000009 HC RX 250: Performed by: OBSTETRICS & GYNECOLOGY

## 2022-07-06 PROCEDURE — 86780 TREPONEMA PALLIDUM: CPT | Performed by: OBSTETRICS & GYNECOLOGY

## 2022-07-06 PROCEDURE — 370N000017 HC ANESTHESIA TECHNICAL FEE, PER MIN: Performed by: OBSTETRICS & GYNECOLOGY

## 2022-07-06 PROCEDURE — 250N000011 HC RX IP 250 OP 636

## 2022-07-06 PROCEDURE — 360N000076 HC SURGERY LEVEL 3, PER MIN: Performed by: OBSTETRICS & GYNECOLOGY

## 2022-07-06 PROCEDURE — 999N000016 HC STATISTIC ATTENDANCE AT DELIVERY

## 2022-07-06 PROCEDURE — 250N000013 HC RX MED GY IP 250 OP 250 PS 637

## 2022-07-06 PROCEDURE — 250N000011 HC RX IP 250 OP 636: Performed by: ANESTHESIOLOGY

## 2022-07-06 PROCEDURE — 271N000001 HC OR GENERAL SUPPLY NON-STERILE: Performed by: OBSTETRICS & GYNECOLOGY

## 2022-07-06 PROCEDURE — 999N000141 HC STATISTIC PRE-PROCEDURE NURSING ASSESSMENT: Performed by: OBSTETRICS & GYNECOLOGY

## 2022-07-06 PROCEDURE — 120N000002 HC R&B MED SURG/OB UMMC

## 2022-07-06 PROCEDURE — 59514 CESAREAN DELIVERY ONLY: CPT | Mod: GC | Performed by: OBSTETRICS & GYNECOLOGY

## 2022-07-06 PROCEDURE — 85018 HEMOGLOBIN: CPT | Performed by: OBSTETRICS & GYNECOLOGY

## 2022-07-06 RX ORDER — SCOLOPAMINE TRANSDERMAL SYSTEM 1 MG/1
1 PATCH, EXTENDED RELEASE TRANSDERMAL ONCE
Status: DISCONTINUED | OUTPATIENT
Start: 2022-07-06 | End: 2022-07-06

## 2022-07-06 RX ORDER — OXYTOCIN/0.9 % SODIUM CHLORIDE 30/500 ML
340 PLASTIC BAG, INJECTION (ML) INTRAVENOUS CONTINUOUS PRN
Status: COMPLETED | OUTPATIENT
Start: 2022-07-06 | End: 2022-07-06

## 2022-07-06 RX ORDER — PROCHLORPERAZINE 25 MG
25 SUPPOSITORY, RECTAL RECTAL EVERY 12 HOURS PRN
Status: DISCONTINUED | OUTPATIENT
Start: 2022-07-06 | End: 2022-07-09 | Stop reason: HOSPADM

## 2022-07-06 RX ORDER — DIPHENHYDRAMINE HYDROCHLORIDE 50 MG/ML
25 INJECTION INTRAMUSCULAR; INTRAVENOUS EVERY 6 HOURS PRN
Status: DISCONTINUED | OUTPATIENT
Start: 2022-07-06 | End: 2022-07-06 | Stop reason: CLARIF

## 2022-07-06 RX ORDER — OXYTOCIN/0.9 % SODIUM CHLORIDE 30/500 ML
100-340 PLASTIC BAG, INJECTION (ML) INTRAVENOUS CONTINUOUS PRN
Status: DISCONTINUED | OUTPATIENT
Start: 2022-07-06 | End: 2022-07-09 | Stop reason: HOSPADM

## 2022-07-06 RX ORDER — TRANEXAMIC ACID 10 MG/ML
1 INJECTION, SOLUTION INTRAVENOUS EVERY 30 MIN PRN
Status: DISCONTINUED | OUTPATIENT
Start: 2022-07-06 | End: 2022-07-09 | Stop reason: HOSPADM

## 2022-07-06 RX ORDER — NALBUPHINE HYDROCHLORIDE 10 MG/ML
2.5-5 INJECTION, SOLUTION INTRAMUSCULAR; INTRAVENOUS; SUBCUTANEOUS EVERY 6 HOURS PRN
Status: DISCONTINUED | OUTPATIENT
Start: 2022-07-06 | End: 2022-07-09 | Stop reason: HOSPADM

## 2022-07-06 RX ORDER — BUPIVACAINE HYDROCHLORIDE 2.5 MG/ML
INJECTION, SOLUTION EPIDURAL; INFILTRATION; INTRACAUDAL
Status: DISCONTINUED | OUTPATIENT
Start: 2022-07-06 | End: 2022-07-06

## 2022-07-06 RX ORDER — NALOXONE HYDROCHLORIDE 0.4 MG/ML
0.2 INJECTION, SOLUTION INTRAMUSCULAR; INTRAVENOUS; SUBCUTANEOUS
Status: DISCONTINUED | OUTPATIENT
Start: 2022-07-06 | End: 2022-07-06

## 2022-07-06 RX ORDER — PROCHLORPERAZINE 25 MG
25 SUPPOSITORY, RECTAL RECTAL EVERY 12 HOURS PRN
Status: DISCONTINUED | OUTPATIENT
Start: 2022-07-06 | End: 2022-07-06

## 2022-07-06 RX ORDER — KETOROLAC TROMETHAMINE 30 MG/ML
30 INJECTION, SOLUTION INTRAMUSCULAR; INTRAVENOUS
Status: DISCONTINUED | OUTPATIENT
Start: 2022-07-06 | End: 2022-07-08

## 2022-07-06 RX ORDER — BUSPIRONE HYDROCHLORIDE 10 MG/1
10 TABLET ORAL
Status: DISCONTINUED | OUTPATIENT
Start: 2022-07-06 | End: 2022-07-09 | Stop reason: HOSPADM

## 2022-07-06 RX ORDER — SIMETHICONE 80 MG
80 TABLET,CHEWABLE ORAL 4 TIMES DAILY PRN
Status: DISCONTINUED | OUTPATIENT
Start: 2022-07-06 | End: 2022-07-09 | Stop reason: HOSPADM

## 2022-07-06 RX ORDER — METOCLOPRAMIDE 10 MG/1
10 TABLET ORAL EVERY 6 HOURS PRN
Status: DISCONTINUED | OUTPATIENT
Start: 2022-07-06 | End: 2022-07-06 | Stop reason: HOSPADM

## 2022-07-06 RX ORDER — SCOLOPAMINE TRANSDERMAL SYSTEM 1 MG/1
1 PATCH, EXTENDED RELEASE TRANSDERMAL
Status: DISCONTINUED | OUTPATIENT
Start: 2022-07-06 | End: 2022-07-06

## 2022-07-06 RX ORDER — NALOXONE HYDROCHLORIDE 0.4 MG/ML
0.4 INJECTION, SOLUTION INTRAMUSCULAR; INTRAVENOUS; SUBCUTANEOUS
Status: DISCONTINUED | OUTPATIENT
Start: 2022-07-06 | End: 2022-07-09 | Stop reason: HOSPADM

## 2022-07-06 RX ORDER — BUPIVACAINE HYDROCHLORIDE 7.5 MG/ML
INJECTION, SOLUTION INTRASPINAL
Status: COMPLETED | OUTPATIENT
Start: 2022-07-06 | End: 2022-07-06

## 2022-07-06 RX ORDER — ALBUTEROL SULFATE 0.83 MG/ML
2.5 SOLUTION RESPIRATORY (INHALATION) EVERY 4 HOURS PRN
Status: DISCONTINUED | OUTPATIENT
Start: 2022-07-06 | End: 2022-07-06 | Stop reason: CLARIF

## 2022-07-06 RX ORDER — FENTANYL CITRATE-0.9 % NACL/PF 10 MCG/ML
PLASTIC BAG, INJECTION (ML) INTRAVENOUS CONTINUOUS PRN
Status: DISCONTINUED | OUTPATIENT
Start: 2022-07-06 | End: 2022-07-06

## 2022-07-06 RX ORDER — METOCLOPRAMIDE HYDROCHLORIDE 5 MG/ML
10 INJECTION INTRAMUSCULAR; INTRAVENOUS EVERY 6 HOURS PRN
Status: DISCONTINUED | OUTPATIENT
Start: 2022-07-06 | End: 2022-07-09 | Stop reason: HOSPADM

## 2022-07-06 RX ORDER — ONDANSETRON 4 MG/1
4 TABLET, ORALLY DISINTEGRATING ORAL EVERY 6 HOURS PRN
Status: DISCONTINUED | OUTPATIENT
Start: 2022-07-06 | End: 2022-07-06 | Stop reason: HOSPADM

## 2022-07-06 RX ORDER — KETOROLAC TROMETHAMINE 30 MG/ML
30 INJECTION, SOLUTION INTRAMUSCULAR; INTRAVENOUS EVERY 6 HOURS
Status: COMPLETED | OUTPATIENT
Start: 2022-07-06 | End: 2022-07-07

## 2022-07-06 RX ORDER — PROCHLORPERAZINE MALEATE 10 MG
10 TABLET ORAL EVERY 6 HOURS PRN
Status: DISCONTINUED | OUTPATIENT
Start: 2022-07-06 | End: 2022-07-09 | Stop reason: HOSPADM

## 2022-07-06 RX ORDER — HYDROCORTISONE 25 MG/G
CREAM TOPICAL 3 TIMES DAILY PRN
Status: DISCONTINUED | OUTPATIENT
Start: 2022-07-06 | End: 2022-07-09 | Stop reason: HOSPADM

## 2022-07-06 RX ORDER — CARBOPROST TROMETHAMINE 250 UG/ML
250 INJECTION, SOLUTION INTRAMUSCULAR
Status: DISCONTINUED | OUTPATIENT
Start: 2022-07-06 | End: 2022-07-09 | Stop reason: HOSPADM

## 2022-07-06 RX ORDER — METOCLOPRAMIDE 10 MG/1
10 TABLET ORAL EVERY 6 HOURS PRN
Status: DISCONTINUED | OUTPATIENT
Start: 2022-07-06 | End: 2022-07-09 | Stop reason: HOSPADM

## 2022-07-06 RX ORDER — MORPHINE SULFATE 1 MG/ML
150 INJECTION, SOLUTION EPIDURAL; INTRATHECAL; INTRAVENOUS ONCE
Status: DISCONTINUED | OUTPATIENT
Start: 2022-07-06 | End: 2022-07-06 | Stop reason: HOSPADM

## 2022-07-06 RX ORDER — FENTANYL CITRATE 50 UG/ML
15 INJECTION, SOLUTION INTRAMUSCULAR; INTRAVENOUS ONCE
Status: DISCONTINUED | OUTPATIENT
Start: 2022-07-06 | End: 2022-07-06 | Stop reason: HOSPADM

## 2022-07-06 RX ORDER — DEXTROSE, SODIUM CHLORIDE, SODIUM LACTATE, POTASSIUM CHLORIDE, AND CALCIUM CHLORIDE 5; .6; .31; .03; .02 G/100ML; G/100ML; G/100ML; G/100ML; G/100ML
INJECTION, SOLUTION INTRAVENOUS CONTINUOUS
Status: DISCONTINUED | OUTPATIENT
Start: 2022-07-06 | End: 2022-07-09 | Stop reason: HOSPADM

## 2022-07-06 RX ORDER — OXYTOCIN 10 [USP'U]/ML
10 INJECTION, SOLUTION INTRAMUSCULAR; INTRAVENOUS
Status: DISCONTINUED | OUTPATIENT
Start: 2022-07-06 | End: 2022-07-09 | Stop reason: HOSPADM

## 2022-07-06 RX ORDER — ONDANSETRON 4 MG/1
4 TABLET, ORALLY DISINTEGRATING ORAL EVERY 6 HOURS PRN
Status: DISCONTINUED | OUTPATIENT
Start: 2022-07-06 | End: 2022-07-09 | Stop reason: HOSPADM

## 2022-07-06 RX ORDER — DIPHENHYDRAMINE HCL 25 MG
25 CAPSULE ORAL EVERY 6 HOURS PRN
Status: DISCONTINUED | OUTPATIENT
Start: 2022-07-06 | End: 2022-07-06 | Stop reason: CLARIF

## 2022-07-06 RX ORDER — NALOXONE HYDROCHLORIDE 0.4 MG/ML
0.4 INJECTION, SOLUTION INTRAMUSCULAR; INTRAVENOUS; SUBCUTANEOUS
Status: DISCONTINUED | OUTPATIENT
Start: 2022-07-06 | End: 2022-07-06

## 2022-07-06 RX ORDER — ACETAMINOPHEN 325 MG/1
975 TABLET ORAL ONCE
Status: COMPLETED | OUTPATIENT
Start: 2022-07-06 | End: 2022-07-06

## 2022-07-06 RX ORDER — ONDANSETRON 2 MG/ML
4 INJECTION INTRAMUSCULAR; INTRAVENOUS EVERY 6 HOURS PRN
Status: DISCONTINUED | OUTPATIENT
Start: 2022-07-06 | End: 2022-07-06

## 2022-07-06 RX ORDER — LABETALOL HYDROCHLORIDE 5 MG/ML
10 INJECTION, SOLUTION INTRAVENOUS
Status: DISCONTINUED | OUTPATIENT
Start: 2022-07-06 | End: 2022-07-06 | Stop reason: CLARIF

## 2022-07-06 RX ORDER — AMOXICILLIN 250 MG
1 CAPSULE ORAL 2 TIMES DAILY
Status: DISCONTINUED | OUTPATIENT
Start: 2022-07-06 | End: 2022-07-09 | Stop reason: HOSPADM

## 2022-07-06 RX ORDER — CARBOPROST TROMETHAMINE 250 UG/ML
250 INJECTION, SOLUTION INTRAMUSCULAR
Status: DISCONTINUED | OUTPATIENT
Start: 2022-07-06 | End: 2022-07-06 | Stop reason: HOSPADM

## 2022-07-06 RX ORDER — PROCHLORPERAZINE 25 MG
25 SUPPOSITORY, RECTAL RECTAL EVERY 12 HOURS PRN
Status: DISCONTINUED | OUTPATIENT
Start: 2022-07-06 | End: 2022-07-06 | Stop reason: HOSPADM

## 2022-07-06 RX ORDER — LIDOCAINE 40 MG/G
CREAM TOPICAL
Status: DISCONTINUED | OUTPATIENT
Start: 2022-07-06 | End: 2022-07-06 | Stop reason: HOSPADM

## 2022-07-06 RX ORDER — IBUPROFEN 800 MG/1
800 TABLET, FILM COATED ORAL
Status: DISCONTINUED | OUTPATIENT
Start: 2022-07-06 | End: 2022-07-09 | Stop reason: HOSPADM

## 2022-07-06 RX ORDER — FENTANYL CITRATE-0.9 % NACL/PF 10 MCG/ML
100 PLASTIC BAG, INJECTION (ML) INTRAVENOUS EVERY 5 MIN PRN
Status: DISCONTINUED | OUTPATIENT
Start: 2022-07-06 | End: 2022-07-06 | Stop reason: HOSPADM

## 2022-07-06 RX ORDER — OXYTOCIN/0.9 % SODIUM CHLORIDE 30/500 ML
100-340 PLASTIC BAG, INJECTION (ML) INTRAVENOUS CONTINUOUS PRN
Status: DISCONTINUED | OUTPATIENT
Start: 2022-07-06 | End: 2022-07-06

## 2022-07-06 RX ORDER — BUPIVACAINE HYDROCHLORIDE 7.5 MG/ML
1.6 INJECTION, SOLUTION EPIDURAL; RETROBULBAR ONCE
Status: DISCONTINUED | OUTPATIENT
Start: 2022-07-06 | End: 2022-07-06 | Stop reason: HOSPADM

## 2022-07-06 RX ORDER — METOCLOPRAMIDE HYDROCHLORIDE 5 MG/ML
10 INJECTION INTRAMUSCULAR; INTRAVENOUS EVERY 6 HOURS PRN
Status: DISCONTINUED | OUTPATIENT
Start: 2022-07-06 | End: 2022-07-06 | Stop reason: HOSPADM

## 2022-07-06 RX ORDER — METHYLERGONOVINE MALEATE 0.2 MG/ML
200 INJECTION INTRAVENOUS
Status: DISCONTINUED | OUTPATIENT
Start: 2022-07-06 | End: 2022-07-06 | Stop reason: HOSPADM

## 2022-07-06 RX ORDER — METHYLERGONOVINE MALEATE 0.2 MG/ML
200 INJECTION INTRAVENOUS
Status: DISCONTINUED | OUTPATIENT
Start: 2022-07-06 | End: 2022-07-09 | Stop reason: HOSPADM

## 2022-07-06 RX ORDER — CEFAZOLIN SODIUM IN 0.9 % NACL 3 G/100 ML
3 INTRAVENOUS SOLUTION, PIGGYBACK (ML) INTRAVENOUS
Status: DISCONTINUED | OUTPATIENT
Start: 2022-07-06 | End: 2022-07-06

## 2022-07-06 RX ORDER — CEFAZOLIN SODIUM 2 G/100ML
2 INJECTION, SOLUTION INTRAVENOUS
Status: DISCONTINUED | OUTPATIENT
Start: 2022-07-06 | End: 2022-07-06

## 2022-07-06 RX ORDER — MISOPROSTOL 200 UG/1
800 TABLET ORAL
Status: DISCONTINUED | OUTPATIENT
Start: 2022-07-06 | End: 2022-07-06 | Stop reason: HOSPADM

## 2022-07-06 RX ORDER — SODIUM CHLORIDE, SODIUM LACTATE, POTASSIUM CHLORIDE, CALCIUM CHLORIDE 600; 310; 30; 20 MG/100ML; MG/100ML; MG/100ML; MG/100ML
INJECTION, SOLUTION INTRAVENOUS CONTINUOUS
Status: DISCONTINUED | OUTPATIENT
Start: 2022-07-06 | End: 2022-07-09 | Stop reason: HOSPADM

## 2022-07-06 RX ORDER — OXYTOCIN/0.9 % SODIUM CHLORIDE 30/500 ML
340 PLASTIC BAG, INJECTION (ML) INTRAVENOUS CONTINUOUS PRN
Status: DISCONTINUED | OUTPATIENT
Start: 2022-07-06 | End: 2022-07-06 | Stop reason: HOSPADM

## 2022-07-06 RX ORDER — ONDANSETRON 2 MG/ML
4 INJECTION INTRAMUSCULAR; INTRAVENOUS EVERY 6 HOURS PRN
Status: DISCONTINUED | OUTPATIENT
Start: 2022-07-06 | End: 2022-07-09 | Stop reason: HOSPADM

## 2022-07-06 RX ORDER — ONDANSETRON 2 MG/ML
4 INJECTION INTRAMUSCULAR; INTRAVENOUS EVERY 6 HOURS PRN
Status: DISCONTINUED | OUTPATIENT
Start: 2022-07-06 | End: 2022-07-06 | Stop reason: HOSPADM

## 2022-07-06 RX ORDER — OXYTOCIN 10 [USP'U]/ML
10 INJECTION, SOLUTION INTRAMUSCULAR; INTRAVENOUS
Status: DISCONTINUED | OUTPATIENT
Start: 2022-07-06 | End: 2022-07-06 | Stop reason: HOSPADM

## 2022-07-06 RX ORDER — PROCHLORPERAZINE MALEATE 10 MG
10 TABLET ORAL EVERY 6 HOURS PRN
Status: DISCONTINUED | OUTPATIENT
Start: 2022-07-06 | End: 2022-07-06

## 2022-07-06 RX ORDER — ACETAMINOPHEN 325 MG/1
975 TABLET ORAL ONCE
Status: DISCONTINUED | OUTPATIENT
Start: 2022-07-06 | End: 2022-07-06 | Stop reason: CLARIF

## 2022-07-06 RX ORDER — PROCHLORPERAZINE MALEATE 10 MG
10 TABLET ORAL EVERY 6 HOURS PRN
Status: DISCONTINUED | OUTPATIENT
Start: 2022-07-06 | End: 2022-07-06 | Stop reason: HOSPADM

## 2022-07-06 RX ORDER — OXYTOCIN/0.9 % SODIUM CHLORIDE 30/500 ML
340 PLASTIC BAG, INJECTION (ML) INTRAVENOUS CONTINUOUS PRN
Status: DISCONTINUED | OUTPATIENT
Start: 2022-07-06 | End: 2022-07-09 | Stop reason: HOSPADM

## 2022-07-06 RX ORDER — MISOPROSTOL 200 UG/1
400 TABLET ORAL
Status: DISCONTINUED | OUTPATIENT
Start: 2022-07-06 | End: 2022-07-06 | Stop reason: HOSPADM

## 2022-07-06 RX ORDER — MISOPROSTOL 200 UG/1
800 TABLET ORAL
Status: DISCONTINUED | OUTPATIENT
Start: 2022-07-06 | End: 2022-07-09 | Stop reason: HOSPADM

## 2022-07-06 RX ORDER — OXYCODONE HYDROCHLORIDE 5 MG/1
5 TABLET ORAL EVERY 4 HOURS PRN
Status: DISCONTINUED | OUTPATIENT
Start: 2022-07-06 | End: 2022-07-09 | Stop reason: HOSPADM

## 2022-07-06 RX ORDER — CITRIC ACID/SODIUM CITRATE 334-500MG
30 SOLUTION, ORAL ORAL
Status: DISCONTINUED | OUTPATIENT
Start: 2022-07-06 | End: 2022-07-06 | Stop reason: HOSPADM

## 2022-07-06 RX ORDER — TRANEXAMIC ACID 10 MG/ML
1 INJECTION, SOLUTION INTRAVENOUS EVERY 30 MIN PRN
Status: DISCONTINUED | OUTPATIENT
Start: 2022-07-06 | End: 2022-07-06 | Stop reason: HOSPADM

## 2022-07-06 RX ORDER — OXYTOCIN 10 [USP'U]/ML
10 INJECTION, SOLUTION INTRAMUSCULAR; INTRAVENOUS
Status: DISCONTINUED | OUTPATIENT
Start: 2022-07-06 | End: 2022-07-06

## 2022-07-06 RX ORDER — ACETAMINOPHEN 325 MG/1
975 TABLET ORAL EVERY 6 HOURS
Status: DISCONTINUED | OUTPATIENT
Start: 2022-07-06 | End: 2022-07-09 | Stop reason: HOSPADM

## 2022-07-06 RX ORDER — CEFAZOLIN SODIUM 2 G/100ML
2 INJECTION, SOLUTION INTRAVENOUS SEE ADMIN INSTRUCTIONS
Status: DISCONTINUED | OUTPATIENT
Start: 2022-07-06 | End: 2022-07-06

## 2022-07-06 RX ORDER — CITRIC ACID/SODIUM CITRATE 334-500MG
30 SOLUTION, ORAL ORAL
Status: COMPLETED | OUTPATIENT
Start: 2022-07-06 | End: 2022-07-06

## 2022-07-06 RX ORDER — SCOLOPAMINE TRANSDERMAL SYSTEM 1 MG/1
1 PATCH, EXTENDED RELEASE TRANSDERMAL ONCE
Status: COMPLETED | OUTPATIENT
Start: 2022-07-06 | End: 2022-07-06

## 2022-07-06 RX ORDER — MISOPROSTOL 200 UG/1
400 TABLET ORAL
Status: DISCONTINUED | OUTPATIENT
Start: 2022-07-06 | End: 2022-07-09 | Stop reason: HOSPADM

## 2022-07-06 RX ORDER — MODIFIED LANOLIN
OINTMENT (GRAM) TOPICAL
Status: DISCONTINUED | OUTPATIENT
Start: 2022-07-06 | End: 2022-07-09 | Stop reason: HOSPADM

## 2022-07-06 RX ORDER — ONDANSETRON 4 MG/1
4 TABLET, ORALLY DISINTEGRATING ORAL EVERY 6 HOURS PRN
Status: DISCONTINUED | OUTPATIENT
Start: 2022-07-06 | End: 2022-07-06

## 2022-07-06 RX ORDER — NALOXONE HYDROCHLORIDE 0.4 MG/ML
0.2 INJECTION, SOLUTION INTRAMUSCULAR; INTRAVENOUS; SUBCUTANEOUS
Status: DISCONTINUED | OUTPATIENT
Start: 2022-07-06 | End: 2022-07-09 | Stop reason: HOSPADM

## 2022-07-06 RX ORDER — KETOROLAC TROMETHAMINE 30 MG/ML
INJECTION, SOLUTION INTRAMUSCULAR; INTRAVENOUS PRN
Status: DISCONTINUED | OUTPATIENT
Start: 2022-07-06 | End: 2022-07-06

## 2022-07-06 RX ORDER — IBUPROFEN 800 MG/1
800 TABLET, FILM COATED ORAL EVERY 6 HOURS
Status: DISCONTINUED | OUTPATIENT
Start: 2022-07-07 | End: 2022-07-09 | Stop reason: HOSPADM

## 2022-07-06 RX ORDER — BISACODYL 10 MG
10 SUPPOSITORY, RECTAL RECTAL DAILY PRN
Status: DISCONTINUED | OUTPATIENT
Start: 2022-07-08 | End: 2022-07-09 | Stop reason: HOSPADM

## 2022-07-06 RX ORDER — MAGNESIUM HYDROXIDE 1200 MG/15ML
LIQUID ORAL PRN
Status: DISCONTINUED | OUTPATIENT
Start: 2022-07-06 | End: 2022-07-06

## 2022-07-06 RX ORDER — AMOXICILLIN 250 MG
2 CAPSULE ORAL 2 TIMES DAILY
Status: DISCONTINUED | OUTPATIENT
Start: 2022-07-06 | End: 2022-07-09 | Stop reason: HOSPADM

## 2022-07-06 RX ORDER — LIDOCAINE 40 MG/G
CREAM TOPICAL
Status: DISCONTINUED | OUTPATIENT
Start: 2022-07-06 | End: 2022-07-09 | Stop reason: HOSPADM

## 2022-07-06 RX ORDER — SODIUM CHLORIDE, SODIUM LACTATE, POTASSIUM CHLORIDE, CALCIUM CHLORIDE 600; 310; 30; 20 MG/100ML; MG/100ML; MG/100ML; MG/100ML
INJECTION, SOLUTION INTRAVENOUS CONTINUOUS
Status: DISCONTINUED | OUTPATIENT
Start: 2022-07-06 | End: 2022-07-06 | Stop reason: HOSPADM

## 2022-07-06 RX ORDER — FENTANYL CITRATE 50 UG/ML
INJECTION, SOLUTION INTRAMUSCULAR; INTRAVENOUS
Status: COMPLETED | OUTPATIENT
Start: 2022-07-06 | End: 2022-07-06

## 2022-07-06 RX ORDER — MORPHINE SULFATE 1 MG/ML
INJECTION, SOLUTION EPIDURAL; INTRATHECAL; INTRAVENOUS
Status: COMPLETED | OUTPATIENT
Start: 2022-07-06 | End: 2022-07-06

## 2022-07-06 RX ADMIN — OXYTOCIN-SODIUM CHLORIDE 0.9% IV SOLN 30 UNIT/500ML 300 ML/HR: 30-0.9/5 SOLUTION at 09:28

## 2022-07-06 RX ADMIN — SENNOSIDES AND DOCUSATE SODIUM 2 TABLET: 50; 8.6 TABLET ORAL at 20:11

## 2022-07-06 RX ADMIN — ONDANSETRON 4 MG: 2 INJECTION INTRAMUSCULAR; INTRAVENOUS at 09:10

## 2022-07-06 RX ADMIN — NALBUPHINE HYDROCHLORIDE 2.5 MG: 10 INJECTION, SOLUTION INTRAMUSCULAR; INTRAVENOUS; SUBCUTANEOUS at 20:11

## 2022-07-06 RX ADMIN — SODIUM CITRATE AND CITRIC ACID MONOHYDRATE 30 ML: 500; 334 SOLUTION ORAL at 07:59

## 2022-07-06 RX ADMIN — BUSPIRONE HYDROCHLORIDE 10 MG: 10 TABLET ORAL at 16:39

## 2022-07-06 RX ADMIN — SODIUM CHLORIDE, POTASSIUM CHLORIDE, SODIUM LACTATE AND CALCIUM CHLORIDE: 600; 310; 30; 20 INJECTION, SOLUTION INTRAVENOUS at 07:48

## 2022-07-06 RX ADMIN — KETOROLAC TROMETHAMINE 30 MG: 30 INJECTION, SOLUTION INTRAMUSCULAR at 22:31

## 2022-07-06 RX ADMIN — KETOROLAC TROMETHAMINE 30 MG: 30 INJECTION, SOLUTION INTRAMUSCULAR at 10:14

## 2022-07-06 RX ADMIN — KETOROLAC TROMETHAMINE 30 MG: 30 INJECTION, SOLUTION INTRAMUSCULAR at 16:38

## 2022-07-06 RX ADMIN — Medication 50 MCG/MIN: at 09:02

## 2022-07-06 RX ADMIN — NALBUPHINE HYDROCHLORIDE 2.5 MG: 10 INJECTION, SOLUTION INTRAMUSCULAR; INTRAVENOUS; SUBCUTANEOUS at 14:33

## 2022-07-06 RX ADMIN — MORPHINE SULFATE 0.15 MG: 1 INJECTION EPIDURAL; INTRATHECAL; INTRAVENOUS at 09:02

## 2022-07-06 RX ADMIN — BUPIVACAINE HYDROCHLORIDE IN DEXTROSE 1.6 ML: 7.5 INJECTION, SOLUTION SUBARACHNOID at 09:02

## 2022-07-06 RX ADMIN — BUPIVACAINE 20 ML: 13.3 INJECTION, SUSPENSION, LIPOSOMAL INFILTRATION at 10:27

## 2022-07-06 RX ADMIN — ACETAMINOPHEN 975 MG: 325 TABLET, FILM COATED ORAL at 20:11

## 2022-07-06 RX ADMIN — SODIUM CHLORIDE, POTASSIUM CHLORIDE, SODIUM LACTATE AND CALCIUM CHLORIDE: 600; 310; 30; 20 INJECTION, SOLUTION INTRAVENOUS at 08:49

## 2022-07-06 RX ADMIN — ACETAMINOPHEN 975 MG: 325 TABLET, FILM COATED ORAL at 14:32

## 2022-07-06 RX ADMIN — SCOPALAMINE 1 PATCH: 1 PATCH, EXTENDED RELEASE TRANSDERMAL at 08:53

## 2022-07-06 RX ADMIN — FENTANYL CITRATE 15 MCG: 50 INJECTION, SOLUTION INTRAMUSCULAR; INTRAVENOUS at 09:02

## 2022-07-06 RX ADMIN — ACETAMINOPHEN 325MG 975 MG: 325 TABLET ORAL at 07:58

## 2022-07-06 RX ADMIN — BUPIVACAINE HYDROCHLORIDE 20 ML: 2.5 INJECTION, SOLUTION EPIDURAL; INFILTRATION; INTRACAUDAL at 10:27

## 2022-07-06 ASSESSMENT — ACTIVITIES OF DAILY LIVING (ADL)
DRESSING/BATHING_DIFFICULTY: NO
DIFFICULTY_COMMUNICATING: NO
ADLS_ACUITY_SCORE: 26
WEAR_GLASSES_OR_BLIND: NO
DIFFICULTY_EATING/SWALLOWING: NO
ADLS_ACUITY_SCORE: 22
CHANGE_IN_FUNCTIONAL_STATUS_SINCE_ONSET_OF_CURRENT_ILLNESS/INJURY: NO
CONCENTRATING,_REMEMBERING_OR_MAKING_DECISIONS_DIFFICULTY: NO
WALKING_OR_CLIMBING_STAIRS_DIFFICULTY: NO
ADLS_ACUITY_SCORE: 18
ADLS_ACUITY_SCORE: 22
NUMBER_OF_TIMES_PATIENT_HAS_FALLEN_WITHIN_LAST_SIX_MONTHS: 1
DOING_ERRANDS_INDEPENDENTLY_DIFFICULTY: NO
ADLS_ACUITY_SCORE: 22
FALL_HISTORY_WITHIN_LAST_SIX_MONTHS: YES
HEARING_DIFFICULTY_OR_DEAF: NO
ADLS_ACUITY_SCORE: 18
ADLS_ACUITY_SCORE: 18
TOILETING_ISSUES: NO
ADLS_ACUITY_SCORE: 26
ADLS_ACUITY_SCORE: 18

## 2022-07-06 NOTE — ANESTHESIA PROCEDURE NOTES
Intrathecal injection Procedure Note    Pre-Procedure   Staff -        Anesthesiologist:  Lizzeth Cowan MD       Resident/Fellow: Alexandro Daigle MD       Performed By: resident and with residents       Procedure performed by resident/fellow/CRNA in presence of a teaching physician.         Location: OR       Pre-Anesthestic Checklist: patient identified, IV checked, risks and benefits discussed, informed consent, monitors and equipment checked, pre-op evaluation, at physician/surgeon's request and post-op pain management  Timeout:       Correct Patient: Yes        Correct Procedure: Yes        Correct Site: Yes        Correct Position: Yes   Procedure Documentation  Procedure: intrathecal injection       Diagnosis: Surgical and Postop Pain       Patient Position: sitting       Patient Prep/Sterile Barriers: sterile gloves, mask, patient draped       Skin prep: Chloraprep       Insertion Site: L3-4. (midline approach).       Needle Gauge: 25.        Needle Length (Inches): 3.5        Spinal Needle Type: Pencan       Introducer used       # of attempts: 1 and  # of redirects:  1    Assessment/Narrative         Paresthesias: No.       Sensory Level: T4       CSF fluid: clear.       Opening pressure was cmH2O while  Sitting.      Medication(s) Administered   0.75% Hyperbaric Bupivacaine (Intrathecal) - Intrathecal   1.6 mL - 7/6/2022 9:02:00 AM  Fentanyl PF (Intrathecal) - Intrathecal   15 mcg - 7/6/2022 9:02:00 AM  Morphine PF 1 mg/mL (Intrathecal) - Intrathecal   0.15 mg - 7/6/2022 9:02:00 AM

## 2022-07-06 NOTE — PROGRESS NOTES
Transfer to OR & C/S Delivery Note  Patient to OR at 0848 via ambulatory.   Delivered viable Male via  section by Dr. Kruse.  To warmer, stimulated and dried by NICU and resource.  APGAR at 1 minute:  8 and APGAR at 5 minutes:  9.    Brought to mother after bundling for bonding.

## 2022-07-06 NOTE — PLAN OF CARE
Goal Outcome Evaluation:    Plan of Care Reviewed With: patient, spouse     Overall Patient Progress: improving     VSS. Pt able to ambulate in room independently this afternoon, tolerated very well. IV saline locked & ribeiro removed @ 1630. Pt tolerating regular diet & PO fluids. Denies pain, tylenol & Toradol given as scheduled. Breastfeeding baby with staff assist, also hand expressing with staff assist. Spent much of shift skin to skin with baby, bonding well.  & mother here, both very supportive.

## 2022-07-06 NOTE — DISCHARGE SUMMARY
[facility] Discharge Summary    Naomi Lemons MRN# [id number]   Age: 27 year old YOB: 1995     Date of Admission:  2022  Date of Discharge:  2022  Admitting Physician: Liu Kruse MD  Discharge Physician: Varsha Lane MD MPH    Admit Dx:   - Intrauterine pregnancy at 37w0d  - Breech presentation  - Partner with retinoblastoma, fetus with suspected RB  - Obesity (BMI 43)    Discharge Dx:  - Same as above, s/p repeat low transverse  section  - Liveborn male infant    Procedures:  -Low transverse  section with two-layer uterine closure via Pfannenstiel incision  - Spinal analgesia    Admit HPI:  Naomi Lemons is a 27 year old  at 37w0d by LMP c/w 12w0d who presented for scheduled primary  section for fetus with suspected retinoblastoma and breech presentation.     Please see her admit H&P for full details of her PMH, PSH, Meds, Allergies and exam on admit.    Operative Course:  Surgery was uncomplicated. EBL from the delivery was 281mL. Please see her  Section Operative Note for full details regarding her delivery.    Operative Findings:  -No subcutaneous scarring, no rectofascial adhesions, no intraabdominal adhesions, no adhesions between bladder and lower uterine segment  -Clear amniotic fluid  -Liveborn male infant in complete breech presentation. Born at 0927 on 22.Loose nuchal cord x2. Apgars 8 at 1 minute & 9 at 5 minutes. Weight 2700 g (5 lb, 15.2 oz).  -Normal uterus, fallopian tubes, and ovaries.     Postoperative Course:  Her postoperative course was uncomplicated. On POD#3, she was meeting all of her postpartum goals and deemed stable for discharge. She was voiding without difficulty, tolerating a regular diet without nausea and vomiting, her pain was well controlled on oral pain medicines and her lochia was appropriate. Her hemoglobin prior to delivery was 12.0 and after delivery  was 11.1. Her Rh status was +  and Rhogam was not indicated.    Discharge Medications:     Review of your medicines        START taking        Dose / Directions   acetaminophen 325 MG tablet  Commonly known as: TYLENOL  Used for: S/P       Dose: 650 mg  Take 2 tablets (650 mg) by mouth every 6 hours as needed for mild pain Start after Delivery.  Quantity: 100 tablet  Refills: 0     ibuprofen 600 MG tablet  Commonly known as: ADVIL/MOTRIN  Used for: S/P       Dose: 600 mg  Take 1 tablet (600 mg) by mouth every 6 hours as needed for moderate pain Start after delivery  Quantity: 60 tablet  Refills: 0     oxyCODONE 5 MG tablet  Commonly known as: ROXICODONE  Used for: S/P       Dose: 5 mg  Take 1 tablet (5 mg) by mouth every 6 hours as needed for pain  Quantity: 12 tablet  Refills: 0     senna-docusate 8.6-50 MG tablet  Commonly known as: SENOKOT-S/PERICOLACE  Used for: S/P       Dose: 1 tablet  Take 1 tablet by mouth daily Start after delivery.  Quantity: 100 tablet  Refills: 0            CONTINUE these medicines which have NOT CHANGED        Dose / Directions   albuterol 108 (90 Base) MCG/ACT inhaler  Commonly known as: PROAIR HFA/PROVENTIL HFA/VENTOLIN HFA      Dose: 2 puff  Inhale 2 puffs into the lungs every 6 hours  Refills: 0     busPIRone 10 MG tablet  Commonly known as: BUSPAR      Dose: 10 mg  Take 10 mg by mouth daily Pt only takes 10 mg in the morning  Refills: 0     FLUoxetine 20 MG capsule  Commonly known as: PROzac      Dose: 20 mg  Take 20 mg by mouth daily  Refills: 0     HEMP OIL OR EXTRACT OR OTHER CBD CANNABINOID (NOT MEDICAL CANNABIS)      Refills: 0     prenatal multivitamin w/iron 27-0.8 MG tablet      Dose: 1 tablet  Take 1 tablet by mouth daily  Refills: 0     VITAMIN D PO      Dose: 5,000 Units  Take 5,000 Units by mouth  Refills: 0            STOP taking      hydrOXYzine 50 MG capsule  Commonly known as: VISTARIL        VITAMIN C PO                  Where to  get your medicines        These medications were sent to [facility] [place], [place] - [address]  [address], [facility] [address]      Phone: [phone]   acetaminophen 325 MG tablet  ibuprofen 600 MG tablet  senna-docusate 8.6-50 MG tablet       Some of these will need a paper prescription and others can be bought over the counter. Ask your nurse if you have questions.    Bring a paper prescription for each of these medications  oxyCODONE 5 MG tablet         Discharge/Disposition:  Naomi Lemons was discharged to home in stable condition with the following instructions/medications:  1) Call for temperature > 100.4, bright red vaginal bleeding >1 pad an hour x 2 hours, foul smelling vaginal discharge, pain not controlled by usual oral pain meds, persistent nausea and vomiting not controlled on medications, drainage or redness from incision site  2) She desired Paragard IUD for contraception.  3) For feeding she decided to breast.  4) She was instructed to follow-up with her primary OB in 6 weeks for a routine postpartum visit.  5) Discharge activity:  No heavy lifting >15 lbs or strenuous activity for 6 weeks, pelvic rest for 6 weeks, no driving or operating machinery while on narcotics.    Tasha Cornell MD  ObGyn Resident, PGY-3  7/9/2022    I personally examined and evaluated Naomi Lemons on 7/9/2022.  I agree with the presentation, hospital details and plan of care this discharge summary with edits by me.   Varsha Lane MD MPH

## 2022-07-06 NOTE — PLAN OF CARE
Data: Pt to OB PACU at 1034 via cart. PIV infusing without complications, ribeiro with clear, yellow urine to gravity, pt denies any pain, denies any nausea and vomiting.  Interventions: IV to pump, monitors and alarms on, SCD on.  Response: stable.  Plan: Patient instructed to notify RN for pain or nausea, routine post op cares, initiate breastfeeding/pumping as soon as patient/infant able.

## 2022-07-06 NOTE — ANESTHESIA CARE TRANSFER NOTE
Patient: Naomi Lemons    Procedure: Procedure(s):   SECTION       Diagnosis: Breech presentation, single or unspecified fetus [O32.1XX0]  Diagnosis Additional Information: No value filed.    Anesthesia Type:   Spinal     Note:    Oropharynx: oropharynx clear of all foreign objects and spontaneously breathing  Level of Consciousness: awake  Oxygen Supplementation: room air    Independent Airway: airway patency satisfactory and stable  Dentition: dentition unchanged  Vital Signs Stable: post-procedure vital signs reviewed and stable  Report to RN Given: handoff report given  Patient transferred to: PACU    Handoff Report: Identifed the Patient, Identified the Reponsible Provider, Reviewed the pertinent medical history, Discussed the surgical course, Reviewed Intra-OP anesthesia mangement and issues during anesthesia, Set expectations for post-procedure period and Allowed opportunity for questions and acknowledgement of understanding      Vitals:  Vitals Value Taken Time   /66 22 1035   Temp 36.3  C (97.4  F) 22 1035   Pulse 80 22 1039   Resp     SpO2 97 % 22 1039   Vitals shown include unvalidated device data.    Electronically Signed By: Alexandro Daigle MD  2022  10:40 AM

## 2022-07-06 NOTE — H&P
L&D History and Physical   2022  Naomi Lemons  [id number]      HPI:   Naomi Lemons is a 27 year old  at 37w0d by LMP c/w 12w0d who presents for scheduled primary  section for fetus with suspected retinoblastoma and breech presenation    She states that she is feeling well today.  Denies LOF or VB. Good FM. No contractions. She denies F/C, HA, vision changes, loss of taste/smell, cough, sore throat, CP, SOB, RUQ pain, N/V, dysuria, constipation, diarrhea, increased edema.    Her pregnancy is notable for:  - Breech presentation  - FOB with retinoblastoma, fetus with suspected RB  - Obesity (BMI 43)    OBHX:   OB History    Para Term  AB Living   1 0 0 0 0 0   SAB IAB Ectopic Multiple Live Births   0 0 0 0 0      # Outcome Date GA Lbr Isac/2nd Weight Sex Delivery Anes PTL Lv   1 Current                Past Medical History:   Diagnosis Date     Anxiety      Asthma      COVID-19      Depressive disorder      Fibromyalgia      Obesity      PTSD (post-traumatic stress disorder)      Vitamin D deficiency        Past Surgical History:   Procedure Laterality Date     ANTERIOR CRUCIATE LIGAMENT REPAIR       POLYPECTOMY       TONSILLECTOMY       wisdom teeth         Medications:   No current facility-administered medications on file prior to encounter.  albuterol (PROAIR HFA/PROVENTIL HFA/VENTOLIN HFA) 108 (90 Base) MCG/ACT inhaler, Inhale 2 puffs into the lungs every 6 hours (Patient not taking: No sig reported)  Ascorbic Acid (VITAMIN C PO),   busPIRone (BUSPAR) 10 MG tablet, Take 10 mg by mouth 2 times daily  FLUoxetine (PROZAC) 20 MG capsule, Take 20 mg by mouth daily  HEMP OIL OR EXTRACT OR OTHER CBD CANNABINOID, NOT MEDICAL CANNABIS,,   Prenatal Vit-Fe Fumarate-FA (PRENATAL MULTIVITAMIN W/IRON) 27-0.8 MG tablet, Take 1 tablet by mouth daily  VITAMIN D PO, Take 5,000 Units by mouth        Allergies   Allergen Reactions     Gluten Meal      vomiting       Family  History   Problem Relation Age of Onset     Diabetes Father      Thyroid Disease Sister      Irritable Bowel Syndrome Sister      Thyroid Disease Brother      Breast Cancer Maternal Grandmother        SocialHX:   Social History     Tobacco Use     Smoking status: Never Smoker     Smokeless tobacco: Never Used   Vaping Use     Vaping Use: Never used   Substance Use Topics     Alcohol use: Not Currently     Drug use: Never       ROS: 10-point ROS negative except as indicated in HPI.    Physical Exam:  Vitals:    22 0734   BP: 118/66   Resp: 18   Temp: 97.9  F (36.6  C)   TempSrc: Oral     General: alert, oriented female, resting in bed in NAD  CV: regular rate and rhythm  Lungs: clear bilaterally, no crackles or wheezes.   Abdomen: soft, gravid, non-tender, EFW 6#.  Extremities: bilateral lower extremities non-tender with trace edema    SVE: Deferred  Membranes: Intact    Presentation: Complete breech by BSUS    FHT  Baseline: 140  Variability: Moderate  Accels: Present  Decels: Absent  Crisman: 0 in 10 minutes    Prenatal ultrasounds:  Growth US (): EFW 16% at 35w6d    Prenatal Labs:   Lab Results   Component Value Date    AS Negative 2022    HEPBANG Nonreactive 2022    HGB 12.0 2022       GBS Status:   No results found for: GBS    No results found for: PAP    Labs:   Patient Vitals for the past 24 hrs:   BP Temp Temp src Resp   22 0734 118/66 97.9  F (36.6  C) Oral 18       A/P:   27 year old  at 37w0d by LMP, here for 12w0d US. Pregnancy notable for the below: breech presentation, suspected fetal RB, obesity    # section:  - Admitted to L&D for scheduled primary  section for breech presentation   - Consent obtained: The risks, benefits, and alternatives of  section were discussed, including the risks of bleeding, infection, injury to surrounding organs, fetal injury, and remote risks of hysterectomy. She consented to a blood transfusion in the event of a  life threatening amount of bleeding. She had time to ask questions and agreed to proceed. Surgical consent was signed. Blood transfusion consent signed.   - Labs: CBC, T&S  - Fen/GI: NPO, IVFs, Vick.  - Pain: Spinal per anesthesia.   - ID: Ancef for brittany-op antibiotics.  - PPX: SCDs prior to surgery     # FOB w/ retinoblastoma  - Delivery indicated at 37 weeks to evaluate fetus for ophthalmalgic lesions    #Fetal Well Being  - Category I FHT    #PNC:     - BMI 43  - Rh positive, Rubella immune, GCT wnl  - Hep B Antigen neg  - GBS neg  - Other infectious labs neg  - Placenta: posterior  - Immunization: s/p TDAP and flu  - Feed: Breast  - BC: not discussed    Patient seen and care plan discussed under supervision of Dr. Kruse and Dr. Jane.    Swati Rome MD  Ob/Gyn Resident, PGY-2  07/06/2022 8:42 AM

## 2022-07-06 NOTE — PLAN OF CARE
Patient arrived to [facility] unit via zoom cart at 1230,with belongings, accompanied by family, with infant in arms. Received report from Markel KRISHNAN and checked bands. Unit and room orientation started. Call light given and within arms reach; no concerns present at this time. Continue with plan of care.

## 2022-07-06 NOTE — ANESTHESIA PROCEDURE NOTES
TAP Procedure Note    Pre-Procedure   Staff -        Anesthesiologist:  Lizzeth Cowan MD       Resident/Fellow: Alexandro Daigle MD       Performed By: resident and with residents       Procedure performed by resident/fellow/CRNA in presence of a teaching physician.         Location: pre-op       Pre-Anesthestic Checklist: patient identified, IV checked, site marked, risks and benefits discussed, informed consent, monitors and equipment checked, pre-op evaluation, at physician/surgeon's request and post-op pain management  Timeout:       Correct Patient: Yes        Correct Procedure: Yes        Correct Site: Yes        Correct Position: Yes        Correct Laterality: Yes        Site Marked: Yes  Procedure Documentation  Procedure: TAP       Diagnosis: POSTOP PAIN       Laterality: bilateral       Patient Position: supine       Patient Prep/Sterile Barriers: sterile gloves, mask       Skin prep: Chloraprep       Needle Gauge: 21.        Needle Length (millimeters): 110        Ultrasound guided       1. Ultrasound was used to identify targeted nerve, plexus, vascular marker, or fascial plane and place a needle adjacent to it in real-time.       2. Ultrasound was used to visualize the spread of anesthetic in close proximity to the above referenced structure.       3. A permanent image is entered into the patient's record.    Assessment/Narrative         The placement was negative for: blood aspirated, painful injection and site bleeding       Paresthesias: No.       Bolus given via needle. no blood aspirated via catheter.        Secured via.        Insertion/Infusion Method: Single Shot       Complications: none    Medication(s) Administered   Bupivacaine 0.25% PF (Infiltration) - Infiltration   20 mL - 7/6/2022 10:27:00 AM  Bupivacaine liposome (Exparel) 1.3% LA inj susp (Infiltration) - Infiltration   20 mL - 7/6/2022 10:27:00 AM   Comments:  Routine bilateral transversus abdominis plane block without  complications. Patient tolerated well.

## 2022-07-06 NOTE — ANESTHESIA POSTPROCEDURE EVALUATION
Patient: Naomi Lemons    Procedure: Procedure(s):   SECTION       Anesthesia Type:  Spinal    Note:  Disposition: Inpatient   Postop Pain Control: Uneventful            Sign Out: Well controlled pain   PONV: No   Neuro/Psych: Uneventful            Sign Out: Acceptable/Baseline neuro status   Airway/Respiratory: Uneventful            Sign Out: Acceptable/Baseline resp. status   CV/Hemodynamics: Uneventful            Sign Out: Acceptable CV status   Other NRE: NONE   DID A NON-ROUTINE EVENT OCCUR? No           Last vitals:  Vitals Value Taken Time   BP 95/80 22 1200   Temp 36.3  C (97.4  F) 22 1035   Pulse 66 22 1206   Resp     SpO2 99 % 22 1206   Vitals shown include unvalidated device data.    Electronically Signed By: Lizzeth Cowan MD  2022  12:14 PM

## 2022-07-06 NOTE — CARE PLAN
Data: Naomi Lemons transferred to 71 via cart at 1230. Baby transferred via parent's arms.  Action: Receiving unit notified of transfer: Yes. Patient and family notified of room change. Report given to Noemi Jefferson at 1230. Belongings sent to receiving unit. Accompanied by Registered Nurse. Oriented patient to surroundings. Call light within reach. ID bands double-checked with receiving RN.  Response: Patient tolerated transfer and is stable.

## 2022-07-06 NOTE — OP NOTE
[facility]  Operative Note     Surgery Date:  2022  Surgeon:  Liu Kruse MD  Assistants:  Swati Rome MD, PGY-2    Pre-op Diagnosis:    - Intrauterine pregnancy at 37w0d  - Breech presentation  - FOB with retinoblastoma, fetus with suspected RB  - Obesity (BMI 43)       Post-op Diagnosis:    - Same   - Liveborn male infant     Procedure:    - Primary low-transverse  section with two layer uterine closure via pfannenstiel incision    Anesthesia:  Spinal  QBL:    281 mL  IVF:    600 mL crystalloid  UOP:    75 mL clear yellow urine at the end of the case  Drains:   Vick Catheter   Specimens:   Routine cord blood/segment  Antibiotics:  2g Ancef  Additional medications: None  Complications:  None    Indications:   Naomi Lemons is a 27 year old  at 37w0d admitted for primary scheduled  section due to breech presentation and suspected fetal retinoblastoma. The risks, benefits, and alternatives of  section were discussed with the patient, and she agreed to proceed.     Findings:   1. No subcutaneous scarring, no rectofascial adhesions, no intraabdominal adhesions, no adhesions between bladder and lower uterine segment  2. Clear amniotic fluid  3. Liveborn male infant in complete breech presentation. Born at 0927 on 22.Loose nuchal cord x2. Apgars 8 at 1 minute & 9 at 5 minutes. Weight 2700 g (5 lb, 15.2 oz).  4. Normal uterus, fallopian tubes, and ovaries.     Procedure Details:   The patient was brought to the OR, where adequate spinal anesthesia was administered. She was placed in the dorsal supine position with a slight leftward tilt. She was prepped and draped in the usual sterile fashion. A surgical time out was performed. A pfannenstiel skin incision was made with the scalpel, and carried down to the underlying fascia with sharp and blunt dissection. The fascia was incised in the midline, and the incision was extended laterally  with blunt dissection. The rectus muscles were  in the midline, and the peritoneum was entered bluntly, and the opening was extended with digital pressure. The bladder blade was placed. A transverse hysterotomy was made with the scalpel in the lower uterine segment, and the incision was extended with digital pressure. The infant was noted to be in the complete breech position, and was delivered atraumatically with typical breech maneuvers . The shoulders delivered easily. Loose nuchal cord x2 was noted. The cord was doubly clamped and cut after 60 seconds, and the infant was handed off to the awaiting nursery staff. A segment of cord was cut and sent to lab.  The placenta was delivered with gentle traction on the umbilical cord and uterine massage. The placenta was discarded. Uterine tone was noted to be firm with 30 units of pitocin given through the running IV and uterine massage. The hysterotomy was closed with a running locked suture of 0 Vicryl. The hysterotomy was then imbricated using an 0 Monocryl suture. The hysterotomy was noted to be hemostatic. The pericolic gutters were cleared of all clots and debris. The hysterotomy was reexamined and noted to be hemostatic. The fascia and rectus muscles were examined and areas of oozing were controlled with electrocautery. The fascia was closed with a running 0 Vicryl suture. The subcutaneous tissue was irrigated and areas of oozing were controlled with electrocautery. The subcutaneous tissue was greater than 2cm in thickness, and was therefore closed with interrupted stiches using a 3-0 Plain gut suture. The skin was closed with a running subcuticular 4-0 Monocryl suture and covered with a sterile dressing.    All sponge, needle, and instrument counts were correct. The patient tolerated the procedure well, and was transferred to recovery in stable condition. Dr. Kruse was present and scrubbed for the procedure.     Swati Rome MD  Ob/Gyn Resident,  PGY-2  07/06/2022 10:53 AM

## 2022-07-07 LAB — HGB BLD-MCNC: 11.1 G/DL (ref 11.7–15.7)

## 2022-07-07 PROCEDURE — 250N000011 HC RX IP 250 OP 636

## 2022-07-07 PROCEDURE — 250N000013 HC RX MED GY IP 250 OP 250 PS 637

## 2022-07-07 PROCEDURE — 120N000002 HC R&B MED SURG/OB UMMC

## 2022-07-07 PROCEDURE — 36415 COLL VENOUS BLD VENIPUNCTURE: CPT

## 2022-07-07 PROCEDURE — 85018 HEMOGLOBIN: CPT

## 2022-07-07 RX ORDER — LIDOCAINE 4 G/G
2 PATCH TOPICAL
Status: DISCONTINUED | OUTPATIENT
Start: 2022-07-07 | End: 2022-07-09 | Stop reason: HOSPADM

## 2022-07-07 RX ADMIN — BUSPIRONE HYDROCHLORIDE 10 MG: 10 TABLET ORAL at 08:20

## 2022-07-07 RX ADMIN — SENNOSIDES AND DOCUSATE SODIUM 2 TABLET: 50; 8.6 TABLET ORAL at 08:19

## 2022-07-07 RX ADMIN — IBUPROFEN 800 MG: 800 TABLET, FILM COATED ORAL at 23:31

## 2022-07-07 RX ADMIN — OXYCODONE HYDROCHLORIDE 5 MG: 5 TABLET ORAL at 11:53

## 2022-07-07 RX ADMIN — OXYCODONE HYDROCHLORIDE 5 MG: 5 TABLET ORAL at 05:45

## 2022-07-07 RX ADMIN — KETOROLAC TROMETHAMINE 30 MG: 30 INJECTION, SOLUTION INTRAMUSCULAR at 05:39

## 2022-07-07 RX ADMIN — SIMETHICONE 80 MG: 80 TABLET, CHEWABLE ORAL at 17:34

## 2022-07-07 RX ADMIN — IBUPROFEN 800 MG: 800 TABLET, FILM COATED ORAL at 11:35

## 2022-07-07 RX ADMIN — SENNOSIDES AND DOCUSATE SODIUM 1 TABLET: 50; 8.6 TABLET ORAL at 20:12

## 2022-07-07 RX ADMIN — ACETAMINOPHEN 975 MG: 325 TABLET, FILM COATED ORAL at 02:23

## 2022-07-07 RX ADMIN — LIDOCAINE PATCH 4% 2 PATCH: 40 PATCH TOPICAL at 20:11

## 2022-07-07 RX ADMIN — ACETAMINOPHEN 975 MG: 325 TABLET, FILM COATED ORAL at 20:11

## 2022-07-07 RX ADMIN — OXYCODONE HYDROCHLORIDE 5 MG: 5 TABLET ORAL at 20:36

## 2022-07-07 RX ADMIN — FLUOXETINE 20 MG: 20 CAPSULE ORAL at 08:20

## 2022-07-07 RX ADMIN — OXYCODONE HYDROCHLORIDE 5 MG: 5 TABLET ORAL at 15:56

## 2022-07-07 RX ADMIN — ACETAMINOPHEN 975 MG: 325 TABLET, FILM COATED ORAL at 08:19

## 2022-07-07 RX ADMIN — SIMETHICONE 80 MG: 80 TABLET, CHEWABLE ORAL at 23:31

## 2022-07-07 RX ADMIN — ACETAMINOPHEN 975 MG: 325 TABLET, FILM COATED ORAL at 14:39

## 2022-07-07 RX ADMIN — IBUPROFEN 800 MG: 800 TABLET, FILM COATED ORAL at 17:34

## 2022-07-07 ASSESSMENT — ACTIVITIES OF DAILY LIVING (ADL)
ADLS_ACUITY_SCORE: 18
ADLS_ACUITY_SCORE: 22
ADLS_ACUITY_SCORE: 18

## 2022-07-07 NOTE — PLAN OF CARE
Goal Outcome Evaluation:    Plan of Care Reviewed With: patient     Overall Patient Progress: improving    VSS. Pt up in room independently. Showered independently this afternoon. Breastfeeding baby with staff assist. Taking scheduled ibuprofen & tylenol for pain, oxycodone once PRN for increased incisional pain.  & mom here, both very supportive. Bonding well with baby.

## 2022-07-07 NOTE — PROGRESS NOTES
[facility] Post-Op / Progress Note         Assessment and Plan:    Assessment:   Post-operative day #1  Low transverse primary  section  L&D complications: Breech presentation  Fetal retinoblastoma       Doing well.      Plan:   Ambulation encouraged  Pain control measures as needed           Interval History:   Doing well.  Pain is well-controlled.  No fevers.  No history of wound drainage, warmth or significant erythema.  Good appetite.  Denies chest pain, shortness of breath, nausea or vomiting.  Ambulatory.  Breastfeeding well.          Significant Problems:    None          Review of Systems:    The patient denies any chest pain, shortness of breath, excessive pain, fever, chills, purulent drainage from the wound, nausea or vomiting.          Medications:   All medications related to the patient's surgery have been reviewed          Physical Exam:   All vitals stable  Temp: 98.3  F (36.8  C) Temp src: Oral BP: 115/82 Pulse: 73   Resp: 16 SpO2: 100 % O2 Device: None (Room air)    Incision bandage clean and dry with minimal or no drainage.   Abdomen soft NT ND  LE without edema/erythema        Data:   All laboratory data related to this surgery reviewed  Hemoglobin   Date Value Ref Range Status   2022 11.1 (L) 11.7 - 15.7 g/dL Final   2022 12.0 11.7 - 15.7 g/dL Final   2019 14.1 11.7 - 15.7 g/dL Final       No imaging studies have been ordered    Eveline Lebron MD

## 2022-07-07 NOTE — PROVIDER NOTIFICATION
07/07/22 1621   Provider Notification   Provider Name/Title Dr. Rome (G2)   Method of Notification Electronic Page   Request Evaluate-Remote   Notification Reason Medication Request;Status Update   Patient is having a lot of abdominal pain after showering. Has had all oral pain medications. Could she have a lidocaine patch ordered? Thank you.

## 2022-07-07 NOTE — LACTATION NOTE
This note was copied from a baby's chart.  Consult for: First time breastfeeding, early term delivery, SGA and under 6# birthweight. We did not start supplements right from birth since feeding well initially, though did start overnight since he was not latching well and risk factors above.     History:   delivery for breech @ 37w0d, SGA @ 5# 15.2 ounce birthweight with 6.3% loss at 26 hours. Diaper output beyond expected for age, serum bilirubin not resulted yet. Maternal history of asthma, obesity, fibromyalgia, PTSD, anxiety and depression managed with Buspar 10 mg every morning (Hale L3 for 10-15 mg BID, monitor for behavioral changes, feeding problems and weight gain) and Prozac 20 mg daily (L2 for 20-60 mg daily, monitor for sedation or irritability, not waking to feed, poor feeding and weight gain), elevated GCT but GTT WNL, had COVID 2022.      Breast exam of mom: Soft, symmetric with intact, everted though short shafted nipples minimally elastic especially on right side.     Oral exam of baby: Feels WNL though unable to elicit suck on finger, too sleepy at time of exam.    Feeding assessment: Sky was sleepy and disinterested in latching, attempted feed then brought donor milk supplement to entice sucking at breast. He still would only take one suck then stop even with drops of milk on SNS tubing. Placed 20 mm nipple shield to stimulate roof of mouth, he latched and fed well using shield. Tolerated 7.5 mL of the 10 offered via SNS @ breast and practiced finger feeding with dad he took the rest of it.     Education provided: Discussed potential feeding challenges with early term infant and importance of frequent feedings and milk expression in early days to establish good milk supply, nose to nipple positioning with good support, anatomy of breast and infant mouth with different ways to get and maintain deep latch, how to apply nipple shield well with nipple pulled up inside of tip, breast  "compressions prn to enhance milk transfer while feeding, benefits of skin to skin and feeding frequently on cue, supply and demand with suggested pumping each time baby gets supplement, benefits of breast massage & hand expression, how to do hands on pumping (fit with binder to use as hands free support). Reviewed how to tell when satiated and if getting enough, breastfeeding log with when and who to call if concerns and lactation resources for after discharge.    Feeding Plan:  Frequent skin to skin, breastfeed on cue at least every 3 hours (8-12 times/day), encourage breast compressions to enhance milk transfer & supplement after with mom's colostrum. If Sky doesn't feed well, offer additional donor milk and increase volume according to cues. Encourage hand expressing after feedings until milk is in, hands on pumping at least 4 times daily to support \"full term\" milk supply, increase pump frequency PRN so breasts have additional stimulation each time he gets extra supplement. Follow up with outpatient lactation consultant within a week of discharge for support with early term infant, check in on milk transfer, infant weights and guidance on weaning from pumping after supply established and he is transferring well.  Supplement Guidelines for infants <37 weeks gestation or < 6 lbs at birth per the [facility] Fe[facility] Methods for the  Infant (35-42 weeks) Policy ([facility] Guidelines):  Birth-24 hours of age: 5ml (1 tsp) every 2-3 hours, at least 8 times in 24 hours  24-48 hours of age: 10 ml (2 tsp) every 2-3 hours, at least 8 times in 24 hours   48-72 hours of age: 15 ml (3 tsp) every 2-3 hours, at least 8 times in 24 hours    "

## 2022-07-07 NOTE — PLAN OF CARE
Problem: Plan of Care - These are the overarching goals to be used throughout the patient stay.    Goal: Plan of Care Review/Shift Note  Outcome: Ongoing, Progressing  Flowsheets (Taken 7/7/2022 0614)  Plan of Care Reviewed With: patient  Overall Patient Progress: improving     Problem: Plan of Care - These are the overarching goals to be used throughout the patient stay.    Goal: Patient-Specific Goal (Individualized)  Outcome: Ongoing, Progressing  Flowsheets (Taken 7/7/2022 0614)  Individualized Care Needs: keep informed, assist as needed  Anxieties, Fears or Concerns: increasing pain  Patient-Specific Goals (Include Timeframe): excited to shower today!!   Goal Outcome Evaluation:    Plan of Care Reviewed With: patient     Overall Patient Progress: improving    VSS. Denies chest pain, SOB, nausea or dizziness. Had some itchiness early on in the shift but it has now subsided. Up ad alexa and voiding, frequent voiding encouraged. PP assessments WDL. Fundus midline and firm at U. Pt has had light to scant bleeding. Incision is covered, dressing is CDI. Pt is wanting to shower this AM, education provided on proper dressing removal and incision care. Pt having more incisional pain this morning. Taking scheduled toradol and tylenol and did take oxycodone X1 this shift. Pt is wanting to breastfeed. Assistance needed because baby gets frustrated at the breast and does not want to latch. Baby also being supplemented with donor milk and mom is pumping as well. Lactation consult released. , Crow, is present and supportive. Great bonding and cue recognition observed.     Continue to assess and assist as needed per POC.

## 2022-07-08 PROCEDURE — 250N000011 HC RX IP 250 OP 636

## 2022-07-08 PROCEDURE — 120N000002 HC R&B MED SURG/OB UMMC

## 2022-07-08 PROCEDURE — 250N000013 HC RX MED GY IP 250 OP 250 PS 637

## 2022-07-08 RX ORDER — ENOXAPARIN SODIUM 100 MG/ML
40 INJECTION SUBCUTANEOUS EVERY 24 HOURS
Status: DISCONTINUED | OUTPATIENT
Start: 2022-07-08 | End: 2022-07-09 | Stop reason: HOSPADM

## 2022-07-08 RX ORDER — AMOXICILLIN 250 MG
1 CAPSULE ORAL DAILY
Qty: 100 TABLET | Refills: 0 | Status: SHIPPED | OUTPATIENT
Start: 2022-07-08

## 2022-07-08 RX ORDER — OXYCODONE HYDROCHLORIDE 5 MG/1
5 TABLET ORAL EVERY 6 HOURS PRN
Qty: 12 TABLET | Refills: 0 | Status: SHIPPED | OUTPATIENT
Start: 2022-07-08 | End: 2022-07-11

## 2022-07-08 RX ORDER — ACETAMINOPHEN 325 MG/1
650 TABLET ORAL EVERY 6 HOURS PRN
Qty: 100 TABLET | Refills: 0 | Status: SHIPPED | OUTPATIENT
Start: 2022-07-08

## 2022-07-08 RX ORDER — IBUPROFEN 600 MG/1
600 TABLET, FILM COATED ORAL EVERY 6 HOURS PRN
Qty: 60 TABLET | Refills: 0 | Status: SHIPPED | OUTPATIENT
Start: 2022-07-08

## 2022-07-08 RX ADMIN — IBUPROFEN 800 MG: 800 TABLET, FILM COATED ORAL at 11:21

## 2022-07-08 RX ADMIN — ACETAMINOPHEN 975 MG: 325 TABLET, FILM COATED ORAL at 03:36

## 2022-07-08 RX ADMIN — ACETAMINOPHEN 975 MG: 325 TABLET, FILM COATED ORAL at 09:53

## 2022-07-08 RX ADMIN — ACETAMINOPHEN 975 MG: 325 TABLET, FILM COATED ORAL at 16:33

## 2022-07-08 RX ADMIN — FLUOXETINE 20 MG: 20 CAPSULE ORAL at 08:06

## 2022-07-08 RX ADMIN — IBUPROFEN 800 MG: 800 TABLET, FILM COATED ORAL at 05:35

## 2022-07-08 RX ADMIN — BUSPIRONE HYDROCHLORIDE 10 MG: 10 TABLET ORAL at 08:07

## 2022-07-08 RX ADMIN — OXYCODONE HYDROCHLORIDE 5 MG: 5 TABLET ORAL at 00:40

## 2022-07-08 RX ADMIN — SENNOSIDES AND DOCUSATE SODIUM 1 TABLET: 50; 8.6 TABLET ORAL at 19:49

## 2022-07-08 RX ADMIN — ENOXAPARIN SODIUM 40 MG: 40 INJECTION SUBCUTANEOUS at 17:44

## 2022-07-08 RX ADMIN — ACETAMINOPHEN 975 MG: 325 TABLET, FILM COATED ORAL at 21:46

## 2022-07-08 RX ADMIN — SIMETHICONE 80 MG: 80 TABLET, CHEWABLE ORAL at 21:46

## 2022-07-08 RX ADMIN — SIMETHICONE 80 MG: 80 TABLET, CHEWABLE ORAL at 08:06

## 2022-07-08 RX ADMIN — SENNOSIDES AND DOCUSATE SODIUM 2 TABLET: 50; 8.6 TABLET ORAL at 08:05

## 2022-07-08 RX ADMIN — IBUPROFEN 800 MG: 800 TABLET, FILM COATED ORAL at 17:44

## 2022-07-08 RX ADMIN — SIMETHICONE 80 MG: 80 TABLET, CHEWABLE ORAL at 16:33

## 2022-07-08 RX ADMIN — OXYCODONE HYDROCHLORIDE 5 MG: 5 TABLET ORAL at 16:33

## 2022-07-08 RX ADMIN — OXYCODONE HYDROCHLORIDE 5 MG: 5 TABLET ORAL at 05:35

## 2022-07-08 RX ADMIN — IBUPROFEN 800 MG: 800 TABLET, FILM COATED ORAL at 23:30

## 2022-07-08 RX ADMIN — OXYCODONE HYDROCHLORIDE 5 MG: 5 TABLET ORAL at 09:53

## 2022-07-08 ASSESSMENT — ACTIVITIES OF DAILY LIVING (ADL)
ADLS_ACUITY_SCORE: 18

## 2022-07-08 NOTE — PROVIDER NOTIFICATION
07/08/22 1651   Provider Notification   Provider Name/Title Dr Rome   Method of Notification Electronic Page   Request Evaluate-Remote   Notification Reason Medication Request   Pt states she was told she would be getting lovenox sometime today or this evening, please order if needed.  Thank you!

## 2022-07-08 NOTE — PLAN OF CARE
Data: Vital signs within normal limits. Postpartum checks within normal limits - see flow record. Patient eating and drinking normally. Patient able to empty bladder independently and is up ambulating. Patient has had a bowel movement and is passing gas. No apparent signs of infection. Incision healing well. Patient performing self cares and is able to care for infant. Breastfeeding with a nipple shield and supplementing with donor breast milk via SNS. Patient is also breast pumping.   Action: Pain has been managed with oral pain medications, lidocaine patch, ice packs, and Simethicone. Patient reported she passed a lot of gas after taking Simethicone.   Response: Positive attachment behaviors observed with infant. Support person, mother, present. Patients : Crow was present for the first half of the shift.   Plan: Continue with the plan of care.

## 2022-07-08 NOTE — PLAN OF CARE
Goal Outcome Evaluation:    Data: Vital signs within normal limits. Postpartum checks within normal limits - see flow record. Patient able to empty bladder independently. No apparent signs of infection. Incision has steri-strips placed and is open to air. Mother is breastfeeding and supplementing with donor milk via SNS nipple shield. She is needing assistance with feedings.   Action: Patient medicated during the shift for incision and gas pain with oxycodone, ibuprofen, tylenol, and simethicone. See MAR. Lidocaine patches in place.   Response: Positive attachment behaviors observed with infant. Patient's mother at bedside.   Plan: Continue with plan of care and assist with feedings as needed.    Plan of Care Reviewed With: patient

## 2022-07-08 NOTE — LACTATION NOTE
This note was copied from a baby's chart.  Follow up visit this morning, Sky has had great diaper output, tolerating 15 mL per supplement and breastfeeding using nipple shield and SNS supplements at breast. Weight loss was 6.3% at 24 hours of age, down to 5# 9.2 ounces, and plan recheck of weight this morning.     Support with feeding and supplement encouraging independence today, discuss plan for home in continuing with supplements first week or so until milk is in and infant gaining weight. Naomi asked about when and how they would transition off of nipple shield since they've been using it at every feeding now. We practiced latching again without shield today, baby did eventually get on however did not to sustain latch unless fully support sandwiching breast tissue throughout feeding and milk available via SNS when sucking; prior to that he repeatedly pulled of and cried even in laid back position attempting to self attach. Encouraged LC follow up first around a week of age and return prn for support weaning from shield and check in as they're weaning from supplements. Also had questions about donor milk, will ask provider for prescription, gave list of options to purchase donor milk in community. Parents live in [place] though plan PCP for [facility] in [place]. [facility] [place] or [place] locations.     Reviewed options for supplements at home including cup feeding, SNS kits that can be purchased online or paced bottle feeding. Encouraged to practice one bottle feed with pacing before discharge today with support of RN. Described how to do cup feed with need for baby to be upright and infant led lapping or sipping milk from cup, for safety to avoid pouring any in his mourh. Naomi has been pumping, this morning getting small puddle in bottom of each bottle She has her breast pump for home, will continue hands on pumping after each feeding until supply well  established and check in with LC outpatient. Offer support and encouragement, answered questions.

## 2022-07-08 NOTE — PROGRESS NOTES
Post Partum Progress Note  POD#2    Subjective:  She is resting comfortably in bed this morning. Continues to work on breast feeding today, hoping improvement throughout day. Pain is improving and well controlled on current medication regimen. She is tolerating PO intake. Lochia present and scant. She is voiding without difficulty. She has passed flatus. She is ambulating without dizziness or difficulty.  She denies headache, changes in vision, nausea/vomiting, chest pain, shortness of breath, RUQ pain, or worsening edema.      Objective:  Vitals:    07/ 1110 07// 1556 07// 0010 / 0800   BP: 121/61 110/63 118/60 114/64   BP Location: Right arm Right arm Right arm Right arm   Patient Position: Sitting Semi-Russo's Semi-Russo's    Cuff Size: Adult Large Adult Large Adult Large    Pulse: 88 76 83 82   Resp: 16 18 18 16   Temp: 98.4  F (36.9  C) 97.6  F (36.4  C) 97.8  F (36.6  C) 97.9  F (36.6  C)   TempSrc: Oral Oral Oral Oral   SpO2:       General: NAD, resting comfortably  CV: Regular rate, well perfused.   Pulm: Normal respiratory effort.  Abd: Soft, non-tender, non-distended. Fundus is firm below the umbilicus.    Incision: incision is clean, dry, intact  Ext: Trace lower extremity edema bilaterally. No calf tenderness.    Assessment/Plan:  Naomi Lemons is a 27 year old  female who is POD#2 s/p PLTCS for breech presentation. Pregnancy c/b obesity (BMI 43).     Routine Postoperative Care   - Encourage routine post-operative goals including ambulation and incentive spirometry  - PNC: Rh positive. Rubella immune. No intervention indicated.  - Pain: controlled on oral medications  - Heme: Hgb 12.0  >  > 11.1  - GI: continue anti-emetics and stool softeners as needed.  - : Voiding spontaneously   - Infant: Stable in room   - Feeding: Plans on breast feeding.  - BC: Plans on ParaGard IUD at 6w   - PPx: Lovenox (BMI)     Discharge to home on POD#2-3. Would prefer  discharge tomorrow, working on breast feeding.     Ignacia Pastrana MD  OB/GYN PGY-3  07/08/22 8:23 AM    Appreciate Dr. Pastrana's note above, patient also seen and examined by me. Patient requires lactation support, plan discharge tomorrow.  I agree with the note above.   Karon Roper MD

## 2022-07-09 VITALS
RESPIRATION RATE: 16 BRPM | DIASTOLIC BLOOD PRESSURE: 79 MMHG | HEART RATE: 69 BPM | TEMPERATURE: 97.6 F | OXYGEN SATURATION: 100 % | SYSTOLIC BLOOD PRESSURE: 130 MMHG

## 2022-07-09 PROCEDURE — 250N000013 HC RX MED GY IP 250 OP 250 PS 637

## 2022-07-09 RX ORDER — BUSPIRONE HYDROCHLORIDE 10 MG/1
10 TABLET ORAL DAILY
Qty: 14 TABLET | Refills: 0 | Status: SHIPPED | OUTPATIENT
Start: 2022-07-09 | End: 2022-07-23

## 2022-07-09 RX ADMIN — ACETAMINOPHEN 975 MG: 325 TABLET, FILM COATED ORAL at 12:13

## 2022-07-09 RX ADMIN — ACETAMINOPHEN 975 MG: 325 TABLET, FILM COATED ORAL at 06:00

## 2022-07-09 RX ADMIN — SENNOSIDES AND DOCUSATE SODIUM 2 TABLET: 50; 8.6 TABLET ORAL at 09:04

## 2022-07-09 RX ADMIN — BUSPIRONE HYDROCHLORIDE 10 MG: 10 TABLET ORAL at 09:04

## 2022-07-09 RX ADMIN — IBUPROFEN 800 MG: 800 TABLET, FILM COATED ORAL at 05:48

## 2022-07-09 RX ADMIN — FLUOXETINE 20 MG: 20 CAPSULE ORAL at 09:04

## 2022-07-09 RX ADMIN — IBUPROFEN 800 MG: 800 TABLET, FILM COATED ORAL at 12:13

## 2022-07-09 ASSESSMENT — ACTIVITIES OF DAILY LIVING (ADL)
ADLS_ACUITY_SCORE: 18

## 2022-07-09 NOTE — PLAN OF CARE
Goal Outcome Evaluation:  VSS and postpartum assessments WDL.  Up ad alexa with steady gait and independent with cares.  Bonding well with infant.Breastfeeding independently using nipple shield.  Supplementing infant with pumped breastmilk and donor breastmilk via SNS at breast with curved tip syringe, finger feed with curved tip syringe and/or paced bottle feed.  Infant taking up to 35mls.  Pain managed with tylenol and ibuprofen per MAR.  , Crow present and supportive.  Reviewed discharge medications.  Reviewed follow-up for appointment for 6 weeks postpartum.  Reviewed discharge instructions and answered all questions.  Discharged home with infant and all belongings at 1330.

## 2022-07-09 NOTE — PLAN OF CARE
VSS on RA. Up ad alexa. Fundus firm at 1 cm below umbilicus. Lochia scant. Voids spontaneously. Passing flatus, denies nausea. Incisional pain managed with Tylenol and Ibuprofen. Incision is CHANTEL, with steri strips in place, c/d/i. Independent with breast feeding and supplementing infant with DBM per bottle. Donor milk prescription placed in chart. Possible discharge today.

## 2022-07-09 NOTE — PROGRESS NOTES
Post Partum Progress Note  POD#3    Subjective:  She is resting comfortably in bed this morning. She complains of getting behind on pain meds overnight. Pain is overall improving and well controlled on current medication regimen. She is tolerating PO intake. Lochia present and scant.  She is voiding without difficulty. She has passed flatus and has had a BM. She is ambulating without dizziness or difficulty.  She denies headache, changes in vision, nausea/vomiting, chest pain, shortness of breath, RUQ pain, or worsening edema. Breastfeeding going okay.    Objective:  Vitals:    / 0010 // 0800 22 1633 / 2330   BP: 118/60 114/64 124/80 122/70   BP Location: Right arm Right arm Right arm Right arm   Patient Position: Semi-Russo's   Fowlers   Cuff Size: Adult Large   Adult Large   Pulse: 83 82 100    Resp: 18 16 16 17   Temp: 97.8  F (36.6  C) 97.9  F (36.6  C) 97.5  F (36.4  C) 97.7  F (36.5  C)   TempSrc: Oral Oral Oral Oral   SpO2:       General: NAD, resting comfortably  CV: Regular rate, well perfused.   Pulm: Normal respiratory effort.  Abd: Soft, non-tender, non-distended. Fundus is firm below the umbilicus.    Incision: incision is clean, dry, intact with steris  Ext: Trace lower extremity edema bilaterally. No calf tenderness.    Assessment/Plan:  Naomi Lemons is a 27 year old  female who is POD#3 s/p PLTCS for breech presentation. Pregnancy c/b obesity (BMI 43).     Routine Postoperative Care   - Encourage routine post-operative goals including ambulation and incentive spirometry  - PNC: Rh positive. Rubella immune. No intervention indicated.  - Pain: controlled on oral medications  - Heme: Hgb 12.0  >  > 11.1  - GI: continue anti-emetics and stool softeners as needed.  - : Voiding spontaneously   - Infant: Stable in room   - Feeding: Plans on breast feeding.  - BC: Plans on ParaGard IUD at 6w   - PPx: Lovenox (BMI)     Discharge to home today.     Tasha  MD Aneta  ObGyn Resident, PGY-2  7/9/2022    I personally examined and evaluated Naomi Lemons on 7/9/2022.  I discussed the patient with Dr. Cornell and agree with the presentation, exam and plan of care documented in this note with edits by me.   Varsha Lane MD MPH

## 2022-07-09 NOTE — PLAN OF CARE
Goal Outcome Evaluation:  [id number]:  VSS and postpartum assessments WDL.  Up ad alexa with steady gait and independent with cares.  Bonding well with infant.  Breastfeeding on cue with some assist, using a nipple shield.  Supplementing infant with donor milk via bottle (pace feeding) with slow flow nipple, infant taking 25mls.  Mother also pumping after feedings and getting 1-2mls of colostrum.  Pain managed with tylenol, ibuprofen, oxycodone and simethicone per MAR.  Incisional steristrips intact.  EDS=4.  Pt has breast pump at home.  Encouaged pt to watch required videos.  Pt's motherDian present in room.  Will continue with postpartum cares and education per plan of care.  Planning to discharge home tomorrow.

## 2022-07-09 NOTE — DISCHARGE INSTRUCTIONS

## 2022-07-13 ENCOUNTER — TELEPHONE (OUTPATIENT)
Dept: MATERNAL FETAL MEDICINE | Facility: CLINIC | Age: 27
End: 2022-07-13

## 2022-07-13 DIAGNOSIS — Z98.891 S/P PRIMARY LOW TRANSVERSE C-SECTION: Primary | ICD-10-CM

## 2022-07-13 NOTE — TELEPHONE ENCOUNTER
July 13, 2022    I spoke with Naomi to review available genetic test results for her infant, Sky. Sky underwent cord blood testing for the known familial RB1 variant associated with Retinoblastoma that was found in Naomi's partner, Crow.    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     Sky was seen by Dr. Morales in ophthalmology clinic yesterday and exam did not show any tumors. We reviewed that this information will be reviewed with their team who will reach out to coordinate follow up given positive result. Naomi had no additional questions at this time and was provided with direct contact information for NATHALY Sheppard with eye clinic if any arise.     Nikole Matta MS, [facility]  Licensed Genetic Counselor  [facility]  Maternal Fetal Medicine  Ph: [phone]  [email or web address]

## 2022-07-18 ENCOUNTER — OFFICE VISIT (OUTPATIENT)
Dept: MATERNAL FETAL MEDICINE | Facility: CLINIC | Age: 27
End: 2022-07-18
Attending: ADVANCED PRACTICE MIDWIFE
Payer: COMMERCIAL

## 2022-07-18 VITALS
SYSTOLIC BLOOD PRESSURE: 101 MMHG | HEART RATE: 99 BPM | DIASTOLIC BLOOD PRESSURE: 71 MMHG | RESPIRATION RATE: 18 BRPM | OXYGEN SATURATION: 99 %

## 2022-07-18 DIAGNOSIS — Z98.891 S/P PRIMARY LOW TRANSVERSE C-SECTION: ICD-10-CM

## 2022-07-18 PROCEDURE — G0463 HOSPITAL OUTPT CLINIC VISIT: HCPCS

## 2022-07-18 PROCEDURE — 99207 PR POST PARTUM EXAM: CPT | Performed by: ADVANCED PRACTICE MIDWIFE

## 2022-07-18 ASSESSMENT — PATIENT HEALTH QUESTIONNAIRE - PHQ9: SUM OF ALL RESPONSES TO PHQ QUESTIONS 1-9: 6

## 2022-07-18 ASSESSMENT — PAIN SCALES - GENERAL: PAINLEVEL: MILD PAIN (2)

## 2022-07-18 NOTE — NURSING NOTE
Pt here w/ baby for PPV. S/p c/section for breech, fetus with + gene for retinoblastoma. Baby latching well w/ nipple shields, mom doing some breastfeeding/pumping+bottling. Pt reports some mood lability, overwhelmed at times. Endorses adequate household support, has made a list to ask others for help with tasks as able. Supportive listening offered. PHQ-9 score 6 today. Pain well controlled w/o analgesics, driving herself without difficulty. Pt notes some abdominal bruising, soreness, feels incision is healing well. Had brought Sky into pediatrician appt last week, pediatrician is also pts primary provider. On discussion provider had recommended Naomi start L-theanine and D3 supplementation for sleeping/mood improvement. Pt has been taking these since last week. Notes x1d diarrhea w/ black appearing loose stool yesterday which has now become brown, still loose. No melissa bleeding in stool. Lochia WNL, anticipatory guidance provided. Pt seen by Karo Rosa CNM, plans 6 wk PPV w/ primary OB.

## 2022-07-18 NOTE — PROGRESS NOTES
Midwife Postpartum 2 Week Visit    Naomi Lemons is a 27 year old here for a 2 week postpartum checkup.     Delivery date was 22. She had a c/s for breech presentation of a viable boy at 37w0d for suspected fetal retinoblastoma. Baby's initial exam showed no signs of retinoblastoma, but his  genetics has since come back saying he is affected and will likely develop retinoblastoma at some point. Naomi is obviously saddened by this news, but is taking things one day at a time. She feels well-supported by friends, family, and her Taoism.    Since delivery, she has been breast feeding and pumping without issue. She is not having any pain from her surgery.  She has not had any signs of infection. Her lochia is now dark red/brown and minimal.  She has not had other complications.      She is voiding and having bowel movements without difficulty. Did have recent episode of loose dark almost black stool yesterday. Today BM is still loose, but normal in color. Was recently started on a supplement (L-theanine) by a family practice provider and she is wondering if this might be the cause of the unusual color.     Contraception was discussed and patient desires Parguard IUD.     Mood is Stable  Patient screened for postpartum depression.       ROS:  CONSTITUTIONAL: NEGATIVE for fever, chills, change in weight  INTEGUMENTARU/SKIN: NEGATIVE for worrisome rashes, moles or lesions  EYES: NEGATIVE for vision changes or irritation  ENT: NEGATIVE for ear, mouth and throat problems  RESP: NEGATIVE for significant cough or SOB  BREAST: NEGATIVE for masses, tenderness or discharge  CV: NEGATIVE for chest pain, palpitations or peripheral edema  GI: NEGATIVE for nausea, abdominal pain, heartburn, or change in bowel habits  : NEGATIVE for unusual urinary or vaginal symptoms. Periods are regular.  MUSCULOSKELETAL: NEGATIVE for significant arthralgias or myalgia  NEURO: NEGATIVE for weakness, dizziness or  paresthesias  PSYCHIATRIC: NEGATIVE for changes in mood or affect      Current Outpatient Medications:      acetaminophen (TYLENOL) 325 MG tablet, Take 2 tablets (650 mg) by mouth every 6 hours as needed for mild pain Start after Delivery., Disp: 100 tablet, Rfl: 0     albuterol (PROAIR HFA/PROVENTIL HFA/VENTOLIN HFA) 108 (90 Base) MCG/ACT inhaler, Inhale 2 puffs into the lungs every 6 hours (Patient not taking: No sig reported), Disp: , Rfl:      busPIRone (BUSPAR) 10 MG tablet, Take 1 tablet (10 mg) by mouth daily for 14 days Pt only takes 10 mg in the morning, Disp: 14 tablet, Rfl: 0     FLUoxetine (PROZAC) 20 MG capsule, Take 20 mg by mouth daily, Disp: , Rfl:      HEMP OIL OR EXTRACT OR OTHER CBD CANNABINOID, NOT MEDICAL CANNABIS,, , Disp: , Rfl:      ibuprofen (ADVIL/MOTRIN) 600 MG tablet, Take 1 tablet (600 mg) by mouth every 6 hours as needed for moderate pain Start after delivery, Disp: 60 tablet, Rfl: 0     Prenatal Vit-Fe Fumarate-FA (PRENATAL MULTIVITAMIN W/IRON) 27-0.8 MG tablet, Take 1 tablet by mouth daily, Disp: , Rfl:      senna-docusate (SENOKOT-S/PERICOLACE) 8.6-50 MG tablet, Take 1 tablet by mouth daily Start after delivery., Disp: 100 tablet, Rfl: 0     VITAMIN D PO, Take 5,000 Units by mouth, Disp: , Rfl: .   OB History    Para Term  AB Living   1 1 1 0 0 1   SAB IAB Ectopic Multiple Live Births   0 0 0 0 1      # Outcome Date GA Lbr Isac/2nd Weight Sex Delivery Anes PTL Lv   1 Term 22 37w0d  2.7 kg (5 lb 15.2 oz) M CS-LTranv Spinal N YURI      Name: OLGA VICENTE,MALE-ASTRID      Apgar1: 8  Apgar5: 9     Last pap:  No results found for: PAP  Hgb in hospital was 11.1    EXAM:  /71 (BP Location: Left arm, Patient Position: Sitting, Cuff Size: Adult Large)   Pulse 99   Resp 18   LMP 10/20/2021   SpO2 99%   Breastfeeding Yes   BMI: There is no height or weight on file to calculate BMI.  Exam:  Constitutional: healthy, alert and no distress  Respiratory: negative,  Percussion normal. Good diaphragmatic excursion.   Psychiatric: mentation appears normal and affect normal/bright  Abdomen: incision well-approximated and healing WNL    ASSESSMENT:   Normal postpartum exam after c/s for breech presentation  Encounter Diagnoses   Name Primary?     S/P primary low transverse       Encounter for routine postpartum follow-up Yes       PLAN:  Reviewed typical cessation of menses while exclusively breastfeeding, but discussed that all people are different and can resume at any time postpartum.  Discussed that our [facility] clinic does not have IUDs to insert so she should return to [facility] for 6 week postpartum visit and IUD insertion.   L-theanine does appear to have diarrhea as a possible side effect. Instructed to discontinue stool softener use while taking supplement.    20 minutes spent on the date of the encounter, doing chart review, history and exam, documentation and further activities as noted.    Karo Rosa CNM on 2022 at 12:24 PM

## 2022-07-21 LAB
Lab: NORMAL
PERFORMING LABORATORY: NORMAL
SPECIMEN STATUS: NORMAL
TEST NAME: NORMAL

## 2022-08-26 NOTE — PROGRESS NOTES
"SUBJECTIVE:  Naomi Lemons,  is here for a postpartum visit.  She had a  Section  on 2022 delivering a healthy baby boy, named Sky weighing 5 lbs 15 oz at term.  Has been diagnosed with retinoblastoma    HPI:  Discuss contraception options post partum. Pt opted for copper IUD.   Everything healing well and non-painful. Able to resume normal activities.   Pap smear will be done today. Prior abnormal pap smear.   Breast feeding until 6 weeks. Stopped completely last week, now solely breast fed.   Baby Sky being screened weekly for tumors due to retinoblastoma findings.     Last PHQ-9 score on record=   PHQ-9 SCORE 2022   PHQ-9 Total Score MyChart -   PHQ-9 Total Score 2     MICHAEL-7 SCORE 2021   Total Score 8 (mild anxiety) -   Total Score 8 2       Pap: ASCUS/-HPV in 2019.    Delivery complications:  No  Breast feeding:  No  Bladder problems:  No  Bowel problems/hemorrhoids:  No  Episiotomy/laceration/incision healed? Yes:   Vaginal flow:  None  Erin:  No  Contraception: IUD  Emotional adjustment:  doing well and happy  Back to work:  2022    12 point review of systems negative other than symptoms noted below or in the HPI.    OBJECTIVE:  Vitals: /82   Ht 1.594 m (5' 2.75\")   Wt 106.9 kg (235 lb 9.6 oz)   LMP 10/20/2021   Breastfeeding No   BMI 42.07 kg/m    BMI= Body mass index is 42.07 kg/m .  General - pleasant female in no acute distress.  Breast -  symmetric and no skin changes  Abdomen - Incision well-healed  Pelvic - EG: normal adult female, BUS: within normal limits, Vagina: well rugated, no discharge, Cervix: no lesions or CMT, Uterus: firm, normal sized and nontender, midplane in position. Adnexae: no masses or tenderness. Pap collected  Rectovaginal - deferred.    ASSESSMENT:    ICD-10-CM    1. Encounter for routine postpartum follow-up  Z39.2    2. Atypical squamous cell changes of undetermined significance (ASCUS) on cervical " cytology with negative high risk human papilloma virus (HPV) test result  R87.610 Pap screen reflex to HPV if ASCUS - recommend age 25 - 29       PLAN:  May resume normal activities without restrictions.  Pap smear was done today.    Full counseling was provided, and all questions were answered.   Return to clinic in one year for an annual visit.     There are no Patient Instructions on file for this visit.  Lorraine Lugo MD    MIDWIFE IUD PLACEMENT NOTE    IUD type: Paragard T-380  Lot #: [id number]  NDC#: [address]-0    HPI:   Naomi Lemons is a 27 year old female here today for IUD insertion.  Patient's last menstrual period was 10/20/2021. Patient is postpartum  Patient did not use Cytotec prior to IUD insertion  Patient does not have any infections or cervicitis  Patient does not have history of liver problems or cancer.     Patient has been given written information.  I have reviewed the risks of the IUD including pregnancy, PID, life threatening infection, perforation, expulsion, cramping, changes in bleeding and ovarian cysts. Benefits of the IUD and alternative family planning methods have been discussed.  The probable mechanisms of action were covered, failure rates, spontaneous expulsion, the importance of checking the string monthly, as well as minor or nuisance side effects such as ovarian cysts, migraines, skin changes irregular and unpredictable bleeding in the first 6 months of use and bleeding patterns after the first 6 months.  The 's printed material was provided for her review.  Patients questions are answered.  Patient has verbalized understanding of risks and benefits and has signed the consent form.    Verification of Procedure:  Before the procedure began, I verified:  Patient Name: Naomi Lemons  Yes  Patient :  1995  Yes  Procedure to be performed:  Yes    Health maintenance updated:  yes    Allergies   Allergen Reactions     Gluten Meal      vomiting      Current Outpatient Medications   Medication Sig Dispense Refill     FLUoxetine (PROZAC) 20 MG capsule Take 20 mg by mouth daily       HEMP OIL OR EXTRACT OR OTHER CBD CANNABINOID, NOT MEDICAL CANNABIS,        magnesium gluconate (MAGONATE) 250 MG tablet Take 500 mg by mouth 2 times daily       Prenatal Vit-Fe Fumarate-FA (PRENATAL MULTIVITAMIN W/IRON) 27-0.8 MG tablet Take 1 tablet by mouth daily       Probiotic Product (PROBIOTIC DAILY PO)        THEANINE PO        vitamin B complex with vitamin C (STRESS TAB) tablet Take 1 tablet by mouth daily       vitamin C (ASCORBIC ACID) 1000 MG TABS Take 1,000 mg by mouth daily       VITAMIN D PO Take 5,000 Units by mouth       acetaminophen (TYLENOL) 325 MG tablet Take 2 tablets (650 mg) by mouth every 6 hours as needed for mild pain Start after Delivery. (Patient not taking: Reported on 7/18/2022) 100 tablet 0     albuterol (PROAIR HFA/PROVENTIL HFA/VENTOLIN HFA) 108 (90 Base) MCG/ACT inhaler Inhale 2 puffs into the lungs every 6 hours (Patient not taking: No sig reported)       busPIRone (BUSPAR) 10 MG tablet Take 1 tablet (10 mg) by mouth daily for 14 days Pt only takes 10 mg in the morning (Patient not taking: Reported on 7/18/2022) 14 tablet 0     ibuprofen (ADVIL/MOTRIN) 600 MG tablet Take 1 tablet (600 mg) by mouth every 6 hours as needed for moderate pain Start after delivery (Patient not taking: No sig reported) 60 tablet 0     senna-docusate (SENOKOT-S/PERICOLACE) 8.6-50 MG tablet Take 1 tablet by mouth daily Start after delivery. (Patient not taking: Reported on 8/30/2022) 100 tablet 0      Past Medical History:   Diagnosis Date     Anxiety      Asthma      COVID-19      Depressive disorder      Fibromyalgia      Obesity      PTSD (post-traumatic stress disorder)      Vitamin D deficiency      Family History   Problem Relation Age of Onset     Diabetes Father      Thyroid Disease Sister      Irritable Bowel Syndrome Sister      Thyroid Disease Brother       Breast Cancer Maternal Grandmother      Social History     Socioeconomic History     Marital status:      Spouse name: Crow     Number of children: Not on file     Years of education: Not on file     Highest education level: Not on file   Occupational History     Comment: non profit organization   Tobacco Use     Smoking status: Never Smoker     Smokeless tobacco: Never Used   Vaping Use     Vaping Use: Never used   Substance and Sexual Activity     Alcohol use: Not Currently     Drug use: Never     Sexual activity: Yes     Partners: Male   Other Topics Concern     Not on file   Social History Narrative     Not on file     Social Determinants of Health     Financial Resource Strain: Not on file   Food Insecurity: Not on file   Transportation Needs: Not on file   Physical Activity: Not on file   Stress: Not on file   Social Connections: Not on file   Intimate Partner Violence: Not on file   Housing Stability: Not on file     Past Surgical History:   Procedure Laterality Date     ANTERIOR CRUCIATE LIGAMENT REPAIR        SECTION N/A 2022    Procedure:  SECTION;  Surgeon: Liu Kruse MD;  Location: [facility] L[facility]     POLYPECTOMY       TONSILLECTOMY       wisdom teeth         REVIEW OF SYSTEMS:  12 point review of systems negative other than symptoms noted below or in the HPI.  12 point review of systems negative other than symptoms noted below.      Procedure:  Uterus assessed for position and is mid-position.  Sterile speculum inserted.  Betadine prep of cervix done.  Tenaculum applied at 10 and 2 o'clock.  Uterine sounded to 8cm's.  Cervical dilators not used.   IUD inserted in the usual fashion without difficulty.  Tenaculum removed with scant bleeding from the cervix.  Strings trimmed to 3 cm's.  Patient tolerated the procedure well    No results found for any visits on 22.    COUNSELING:  Verbal and written instructions given to patient regarding checking IUD strings.    Reviewed  warning signs of fever, sharp/severe abdominal pain, reassured it can be normal to have menstrual like cramping after placement and spotting/light bleeding may last a few weeks after placement.    Reviewed with patient that the IUD needs to be replaced in 10 years, nothing in vagina for 2 days following placement of Mirena or Kamla IUD's.  Use b/u method for one week following IUD placement.    Follow up appointment as needed.

## 2022-08-30 ENCOUNTER — PRENATAL OFFICE VISIT (OUTPATIENT)
Dept: OBGYN | Facility: CLINIC | Age: 27
End: 2022-08-30
Payer: COMMERCIAL

## 2022-08-30 VITALS
HEIGHT: 63 IN | WEIGHT: 235.6 LBS | DIASTOLIC BLOOD PRESSURE: 82 MMHG | BODY MASS INDEX: 41.75 KG/M2 | SYSTOLIC BLOOD PRESSURE: 110 MMHG

## 2022-08-30 DIAGNOSIS — R87.610 ATYPICAL SQUAMOUS CELL CHANGES OF UNDETERMINED SIGNIFICANCE (ASCUS) ON CERVICAL CYTOLOGY WITH NEGATIVE HIGH RISK HUMAN PAPILLOMA VIRUS (HPV) TEST RESULT: ICD-10-CM

## 2022-08-30 DIAGNOSIS — Z30.430 ENCOUNTER FOR INSERTION OF INTRAUTERINE CONTRACEPTIVE DEVICE: ICD-10-CM

## 2022-08-30 PROCEDURE — 88175 CYTOPATH C/V AUTO FLUID REDO: CPT | Performed by: OBSTETRICS & GYNECOLOGY

## 2022-08-30 PROCEDURE — 58300 INSERT INTRAUTERINE DEVICE: CPT | Performed by: OBSTETRICS & GYNECOLOGY

## 2022-08-30 RX ORDER — COPPER 313.4 MG/1
1 INTRAUTERINE DEVICE INTRAUTERINE ONCE
COMMUNITY

## 2022-08-30 RX ORDER — MULTIVIT WITH MINERALS/LUTEIN
1000 TABLET ORAL DAILY
COMMUNITY

## 2022-08-30 RX ORDER — MULTIVIT,TX WITH IRON,MINERALS
500 TABLET, EXTENDED RELEASE ORAL 2 TIMES DAILY
COMMUNITY

## 2022-08-30 RX ORDER — COPPER 313.4 MG/1
1 INTRAUTERINE DEVICE INTRAUTERINE ONCE
Status: COMPLETED
Start: 2022-08-30 | End: 2022-08-30

## 2022-08-30 RX ADMIN — COPPER 1 EACH: 313.4 INTRAUTERINE DEVICE INTRAUTERINE at 14:56

## 2022-08-30 ASSESSMENT — PATIENT HEALTH QUESTIONNAIRE - PHQ9
5. POOR APPETITE OR OVEREATING: NOT AT ALL
SUM OF ALL RESPONSES TO PHQ QUESTIONS 1-9: 2

## 2022-08-30 ASSESSMENT — ANXIETY QUESTIONNAIRES
2. NOT BEING ABLE TO STOP OR CONTROL WORRYING: NOT AT ALL
GAD7 TOTAL SCORE: 2
IF YOU CHECKED OFF ANY PROBLEMS ON THIS QUESTIONNAIRE, HOW DIFFICULT HAVE THESE PROBLEMS MADE IT FOR YOU TO DO YOUR WORK, TAKE CARE OF THINGS AT HOME, OR GET ALONG WITH OTHER PEOPLE: NOT DIFFICULT AT ALL
1. FEELING NERVOUS, ANXIOUS, OR ON EDGE: NOT AT ALL
GAD7 TOTAL SCORE: 2
5. BEING SO RESTLESS THAT IT IS HARD TO SIT STILL: NOT AT ALL
3. WORRYING TOO MUCH ABOUT DIFFERENT THINGS: SEVERAL DAYS
7. FEELING AFRAID AS IF SOMETHING AWFUL MIGHT HAPPEN: NOT AT ALL
6. BECOMING EASILY ANNOYED OR IRRITABLE: SEVERAL DAYS

## 2022-09-01 ENCOUNTER — TELEPHONE (OUTPATIENT)
Dept: OBGYN | Facility: CLINIC | Age: 27
End: 2022-09-01

## 2022-09-01 NOTE — LETTER
2022      Naomi Morales Cristo  [address] [place] [address]        To Whom It May Concern,      Naomi Beckerinéssara,  89, is a patient of mine and was under my direct care and supervision. She has had her postpartum assessment and has been cleared to return to work as of 22 without any restrictions.  Please reach out to our clinic with any further questions.      Sincerely,        Lorraine Lugo MD

## 2022-09-01 NOTE — TELEPHONE ENCOUNTER
Patient is needing a letter in order to return to work today.  Postpartum visit 10/30/22  Able to resume normal activities.     [facility]  Patient aware and verbalizing understanding.  Riley Dillon RN on 9/1/2022 at 9:50 AM

## 2022-09-01 NOTE — TELEPHONE ENCOUNTER
Patient needs a doctors note saying she can return to work. Okay to call patient with questions. Please sent the note to her e-mail at [email or web address]

## 2022-09-02 LAB
BKR LAB AP GYN ADEQUACY: NORMAL
BKR LAB AP GYN INTERPRETATION: NORMAL
BKR LAB AP HPV REFLEX: NORMAL
BKR LAB AP PREVIOUS ABNL DX: NORMAL
BKR LAB AP PREVIOUS ABNORMAL: NORMAL
PATH REPORT.COMMENTS IMP SPEC: NORMAL
PATH REPORT.COMMENTS IMP SPEC: NORMAL
PATH REPORT.RELEVANT HX SPEC: NORMAL

## 2022-09-07 PROBLEM — R87.610 ASCUS OF CERVIX WITH NEGATIVE HIGH RISK HPV: Status: ACTIVE | Noted: 2019-12-18

## 2022-09-07 PROBLEM — R87.610 ASCUS OF CERVIX WITH NEGATIVE HIGH RISK HPV: Status: RESOLVED | Noted: 2019-12-18 | Resolved: 2022-09-07

## 2022-10-23 ENCOUNTER — HEALTH MAINTENANCE LETTER (OUTPATIENT)
Age: 27
End: 2022-10-23

## 2022-12-05 ENCOUNTER — OFFICE VISIT (OUTPATIENT)
Dept: FAMILY MEDICINE | Facility: CLINIC | Age: 27
End: 2022-12-05
Payer: COMMERCIAL

## 2022-12-05 VITALS
DIASTOLIC BLOOD PRESSURE: 78 MMHG | WEIGHT: 238 LBS | HEIGHT: 62 IN | SYSTOLIC BLOOD PRESSURE: 98 MMHG | BODY MASS INDEX: 43.79 KG/M2 | OXYGEN SATURATION: 97 % | TEMPERATURE: 99.3 F | HEART RATE: 101 BPM

## 2022-12-05 DIAGNOSIS — B37.31 CANDIDIASIS OF VAGINA: ICD-10-CM

## 2022-12-05 DIAGNOSIS — L02.224 FURUNCLE OF GROIN: Primary | ICD-10-CM

## 2022-12-05 PROCEDURE — 99213 OFFICE O/P EST LOW 20 MIN: CPT | Performed by: FAMILY MEDICINE

## 2022-12-05 RX ORDER — FLUCONAZOLE 150 MG/1
150 TABLET ORAL ONCE
Qty: 1 TABLET | Refills: 0 | Status: SHIPPED | OUTPATIENT
Start: 2022-12-05 | End: 2022-12-05

## 2022-12-05 RX ORDER — SULFAMETHOXAZOLE/TRIMETHOPRIM 800-160 MG
1 TABLET ORAL 2 TIMES DAILY
Qty: 20 TABLET | Refills: 0 | Status: SHIPPED | OUTPATIENT
Start: 2022-12-05 | End: 2022-12-15

## 2022-12-05 ASSESSMENT — PAIN SCALES - GENERAL: PAINLEVEL: SEVERE PAIN (7)

## 2022-12-05 NOTE — PROGRESS NOTES
"  Assessment & Plan     Furuncle of groin  Patient has a furuncle on the left side of the groin.  No fluctuation noted.  I&D is not indicated today.  Recommending to start the course of Bactrim to prevent any further worsening and help resolve that.  Along with that recommending to apply gentle heat to the affected area as well as use ibuprofen to lower inflammation and pain.  If in the next 2 days or so symptoms are not improving or any worsening noted, she may need an I&D.  Recommending to contact us back if needed  - sulfamethoxazole-trimethoprim (BACTRIM DS) 800-160 MG tablet; Take 1 tablet by mouth 2 times daily for 10 days    Candidiasis of vagina  With antibiotic use patient gets a yeast infection.  Requested Diflucan which was ordered for her for use as needed.  - fluconazole (DIFLUCAN) 150 MG tablet; Take 1 tablet (150 mg) by mouth once for 1 dose             BMI:   Estimated body mass index is 43.08 kg/m  as calculated from the following:    Height as of this encounter: 1.583 m (5' 2.32\").    Weight as of this encounter: 108 kg (238 lb).           Return in about 2 days (around 12/7/2022) for If symptoms are not improving.    Aman Su MD  [facility] is a 27 year old, presenting for the following health issues:  Consult ( Nodule or cyst/)      History of Present Illness       Reason for visit:  Nodule or cyst  Symptom onset:  1-3 days ago  Symptom intensity:  Moderate  Symptom progression:  Staying the same  Had these symptoms before:  No  What makes it worse:  No  What makes it better:  No    She eats 4 or more servings of fruits and vegetables daily.She consumes 0 sweetened beverage(s) daily.She exercises with enough effort to increase her heart rate 9 or less minutes per day.  She exercises with enough effort to increase her heart rate 3 or less days per week. She is missing 1 dose(s) of medications per week.  She is not taking prescribed medications " "regularly due to remembering to take.             Review of Systems   CONSTITUTIONAL: NEGATIVE for fever, chills, change in weight  GI: NEGATIVE for nausea, abdominal pain, heartburn, or change in bowel habits  : normal menstrual cycles      Objective    BP 98/78   Pulse 101   Temp 99.3  F (37.4  C) (Tympanic)   Ht 1.583 m (5' 2.32\")   Wt 108 kg (238 lb)   LMP  (LMP Unknown)   SpO2 97%   BMI 43.08 kg/m    Body mass index is 43.08 kg/m .  Physical Exam   GENERAL: healthy, alert and no distress  ABDOMEN: soft, nontender, no hepatosplenomegaly, no masses and bowel sounds normal   (female): normal female external genitalia.  On left labia majora there is an erythematous papule with induration and mild tenderness.  There is no fluctuation  MS: no gross musculoskeletal defects noted, no edema                    "

## 2023-06-01 ENCOUNTER — HEALTH MAINTENANCE LETTER (OUTPATIENT)
Age: 28
End: 2023-06-01

## 2024-04-30 ENCOUNTER — TRANSFERRED RECORDS (OUTPATIENT)
Dept: HEALTH INFORMATION MANAGEMENT | Facility: CLINIC | Age: 29
End: 2024-04-30
Payer: COMMERCIAL

## 2024-05-01 ENCOUNTER — TRANSCRIBE ORDERS (OUTPATIENT)
Dept: OTHER | Age: 29
End: 2024-05-01

## 2024-05-01 DIAGNOSIS — H35.9 UNSPECIFIED RETINAL DISORDER: Primary | ICD-10-CM

## 2024-05-02 ENCOUNTER — TELEPHONE (OUTPATIENT)
Dept: OPHTHALMOLOGY | Facility: CLINIC | Age: 29
End: 2024-05-02
Payer: COMMERCIAL

## 2024-05-02 NOTE — TELEPHONE ENCOUNTER
[facility] Call [facility]    Phone Message    May a detailed message be left on voicemail: yes     Reason for Call: Other: Pt calling in to schedule an appointment, she was referred to Dr Morales,Pt informed she didnt need to see Dr Morales thats just who she saw when she was pregnant. she would like to know if she needs to be seen sooner than the next available, She is currently scheduled with Dr Hernandez on 06/04. Please call back to confirm if the 06/04 is okay or if she needs to be seen sooner, referral does not state urgent.      Action Taken: Other: P EYE     Travel Screening: Not Applicable

## 2024-05-06 NOTE — TELEPHONE ENCOUNTER
Callled and LVM     If Naomi is not having any eye issues - ok to keep appointment as is -     Marina Monroy Communication Facilitator on 5/6/2024 at 3:19 PM

## 2024-05-20 DIAGNOSIS — Z80.8 FAMILY HISTORY OF RETINOBLASTOMA: Primary | ICD-10-CM

## 2024-06-04 ENCOUNTER — OFFICE VISIT (OUTPATIENT)
Dept: OPHTHALMOLOGY | Facility: CLINIC | Age: 29
End: 2024-06-04
Attending: OPHTHALMOLOGY
Payer: COMMERCIAL

## 2024-06-04 DIAGNOSIS — H43.393 VITREOUS SYNERESIS OF BOTH EYES: Primary | ICD-10-CM

## 2024-06-04 DIAGNOSIS — H35.9 UNSPECIFIED RETINAL DISORDER: ICD-10-CM

## 2024-06-04 PROCEDURE — 99214 OFFICE O/P EST MOD 30 MIN: CPT | Mod: GC | Performed by: OPHTHALMOLOGY

## 2024-06-04 PROCEDURE — 92134 CPTRZ OPH DX IMG PST SGM RTA: CPT | Performed by: OPHTHALMOLOGY

## 2024-06-04 PROCEDURE — 99214 OFFICE O/P EST MOD 30 MIN: CPT | Performed by: OPHTHALMOLOGY

## 2024-06-04 ASSESSMENT — TONOMETRY
OD_IOP_MMHG: 20
IOP_METHOD: TONOPEN
OS_IOP_MMHG: 18

## 2024-06-04 ASSESSMENT — CONF VISUAL FIELD
OD_SUPERIOR_NASAL_RESTRICTION: 0
OD_INFERIOR_TEMPORAL_RESTRICTION: 0
OD_SUPERIOR_TEMPORAL_RESTRICTION: 0
OS_INFERIOR_NASAL_RESTRICTION: 0
OS_NORMAL: 1
OS_SUPERIOR_TEMPORAL_RESTRICTION: 0
OS_INFERIOR_TEMPORAL_RESTRICTION: 0
OD_INFERIOR_NASAL_RESTRICTION: 0
OS_SUPERIOR_NASAL_RESTRICTION: 0
OD_NORMAL: 1

## 2024-06-04 ASSESSMENT — EXTERNAL EXAM - LEFT EYE: OS_EXAM: NORMAL

## 2024-06-04 ASSESSMENT — VISUAL ACUITY
METHOD: SNELLEN - LINEAR
OD_SC: 20/25
OS_SC: 20/20

## 2024-06-04 ASSESSMENT — SLIT LAMP EXAM - LIDS
COMMENTS: NORMAL
COMMENTS: NORMAL

## 2024-06-04 ASSESSMENT — CUP TO DISC RATIO
OD_RATIO: 0.2
OS_RATIO: 0.2

## 2024-06-04 ASSESSMENT — EXTERNAL EXAM - RIGHT EYE: OD_EXAM: NORMAL

## 2024-06-04 NOTE — PROGRESS NOTES
CC -   blurred vision    INTERVAL HISTORY - Initial visit with me, referred by local eye clinic for eval of OD, possible abnormal macula vs shiny ILM of youth    PMH -   Naomi Cordova is a  29 year old year-old patient with history of  referred by local eye clinic for eval of OD, possible abnormal macula vs shiny ILM of youth. She had other eye exams in the past and was given eye glasses to wear but she did not find them helpful. She also notices eye fatigue when reading. No flashes/floaters.     has sporadic mutation causing bilateral retinoblastoma and her son who is 2 years old also has bilateral retinoblastoma.  Patient 's parents has no h/o RB (other note in chart stating otherwise is incorrect)    PAST OCULAR SURGERY  None    RETINAL IMAGING:  OCT macula 6/4/24  OD - normal retina, PHF attached  OS - normal retina, PHF attached      ASSESSMENT & PLAN    # Concern for macular edema   - referred due to concern for macula changes on fundus photo at optometry clinic   - today no macula edema on OCT mac   - retina appears healthy; shiny ILM of youth   - routine eye care      # Son with bilateral retinoblastoma   -  has sporadic mutation   - son with bilateral RB, follows with Dr. Morales        # Syneresis OU   - advised S/Sx RD 6/2024      return to clinic: as needed    Lois Cifuentes MD  PGY3 Ophthalmology Resident  [facility]    ATTESTATION     Attending Attestation:     Complete documentation of historical and exam elements from today's encounter can be found in the full encounter summary report (not reduplicated in this progress note).  I personally obtained the chief complaint(s) and history of present illness.  I confirmed and edited as necessary the review of systems, past medical/surgical history, family history, social history, and examination findings as documented by others; and I examined the patient myself.  I personally reviewed the relevant tests, images, and  reports as documented above.  I personally reviewed the ophthalmic test(s) associated with this encounter, agree with the interpretation(s) as documented by the resident/fellow, and have edited the corresponding report(s) as necessary.   I formulated and edited as necessary the assessment and plan and discussed the findings and management plan with the patient and family    Faviola Hernandez MD, PhD  , Vitreoretinal Surgery  Department of Ophthalmology  [facility]

## 2024-06-04 NOTE — NURSING NOTE
Chief Complaints and History of Present Illnesses   Patient presents with    Vision Changes Ou     Chief Complaint(s) and History of Present Illness(es)       Vision Changes Ou              Laterality: both eyes    Onset: 1 year ago              Comments    Pt. States that she had noticed worsening VA BE over the last year. Was seen by an Optometrist last month and told there was swelling around macula's both eyes. No flashes or floaters BE. Occasional irritation and heaviness BE.   Chaparrita Flores COT 1:45 PM June 4, 2024

## 2024-06-08 ENCOUNTER — HEALTH MAINTENANCE LETTER (OUTPATIENT)
Age: 29
End: 2024-06-08

## 2025-06-15 ENCOUNTER — HEALTH MAINTENANCE LETTER (OUTPATIENT)
Age: 30
End: 2025-06-15

## 2025-07-16 ENCOUNTER — OFFICE VISIT (OUTPATIENT)
Dept: OBGYN | Facility: CLINIC | Age: 30
End: 2025-07-16
Payer: COMMERCIAL

## 2025-07-16 VITALS
HEIGHT: 63 IN | SYSTOLIC BLOOD PRESSURE: 122 MMHG | DIASTOLIC BLOOD PRESSURE: 79 MMHG | HEART RATE: 91 BPM | BODY MASS INDEX: 42.16 KG/M2

## 2025-07-16 DIAGNOSIS — Z31.69 ENCOUNTER FOR PRECONCEPTION CONSULTATION: ICD-10-CM

## 2025-07-16 DIAGNOSIS — Z30.432 ENCOUNTER FOR IUD REMOVAL: ICD-10-CM

## 2025-07-16 DIAGNOSIS — Z12.4 CERVICAL CANCER SCREENING: Primary | ICD-10-CM

## 2025-07-16 PROCEDURE — 87624 HPV HI-RISK TYP POOLED RSLT: CPT

## 2025-07-16 NOTE — PROGRESS NOTES
[facility]  Gynecology Visit    Reason for Visit: IUD removal    HPI:    Astrid SUSIE Olga Lemons is a 30 year old , here for IUD removal. Planning pregnancy in the near future. Her first child has retinoblastoma and just got cleared for surveillance every 4 months today! Has received extensive counseling regarding the genetics of retinoblastoma from her last pregnancy and feels very informed of all of her options with planning pregnancy and planning for testing during pregnancy; does not desire additional information prior to next pregnancy. No other gynecologic concerns. Not yet taking a prenatal. Last Pap 3 years ago.       OBHx  OB History    Para Term  AB Living   1 1 1 0 0 1   SAB IAB Ectopic Multiple Live Births   0 0 0 0 1      # Outcome Date GA Lbr Isac/2nd Weight Sex Type Anes PTL Lv   1 Term 21 37w0d  2.7 kg (5 lb 15.2 oz) M CS-LTranv Spinal N YURI      Name: OLGA LEMONS,MALE-ASTRID      Apgar1: 8  Apgar5: 9       Past Medical History:   Diagnosis Date    Anxiety     Asthma     COVID-19     Depressive disorder     Fibromyalgia     Obesity     PTSD (post-traumatic stress disorder)     Vitamin D deficiency        Past Surgical History:   Procedure Laterality Date    ANTERIOR CRUCIATE LIGAMENT REPAIR       SECTION N/A 2022    Procedure:  SECTION;  Surgeon: Liu Kruse MD;  Location: [facility] L[facility]    POLYPECTOMY      TONSILLECTOMY      wisdom teeth           Current Outpatient Medications:     acetaminophen (TYLENOL) 325 MG tablet, Take 2 tablets (650 mg) by mouth every 6 hours as needed for mild pain Start after Delivery. (Patient not taking: Reported on 2022), Disp: 100 tablet, Rfl: 0    albuterol (PROAIR HFA/PROVENTIL HFA/VENTOLIN HFA) 108 (90 Base) MCG/ACT inhaler, Inhale 2 puffs into the lungs every 6 hours, Disp: , Rfl:     busPIRone (BUSPAR) 10 MG tablet, Take 1 tablet (10 mg) by mouth daily for 14 days Pt only takes 10 mg in the morning  (Patient not taking: Reported on 7/18/2022), Disp: 14 tablet, Rfl: 0    FLUoxetine (PROZAC) 20 MG capsule, Take 20 mg by mouth daily, Disp: , Rfl:     HEMP OIL OR EXTRACT OR OTHER CBD CANNABINOID, NOT MEDICAL CANNABIS,, , Disp: , Rfl:     ibuprofen (ADVIL/MOTRIN) 600 MG tablet, Take 1 tablet (600 mg) by mouth every 6 hours as needed for moderate pain Start after delivery (Patient not taking: No sig reported), Disp: 60 tablet, Rfl: 0    magnesium gluconate (MAGONATE) 250 MG tablet, Take 500 mg by mouth 2 times daily, Disp: , Rfl:     paragard intrauterine copper device, 1 each by Intrauterine route once, Disp: , Rfl:     Prenatal Vit-Fe Fumarate-FA (PRENATAL MULTIVITAMIN W/IRON) 27-0.8 MG tablet, Take 1 tablet by mouth daily, Disp: , Rfl:     Probiotic Product (PROBIOTIC DAILY PO), , Disp: , Rfl:     senna-docusate (SENOKOT-S/PERICOLACE) 8.6-50 MG tablet, Take 1 tablet by mouth daily Start after delivery. (Patient not taking: Reported on 8/30/2022), Disp: 100 tablet, Rfl: 0    THEANINE PO, , Disp: , Rfl:     vitamin B complex with vitamin C (STRESS TAB) tablet, Take 1 tablet by mouth daily, Disp: , Rfl:     vitamin C (ASCORBIC ACID) 1000 MG TABS, Take 1,000 mg by mouth daily, Disp: , Rfl:     VITAMIN D PO, Take 5,000 Units by mouth, Disp: , Rfl:     Allergies   Allergen Reactions    Gluten Meal      vomiting       Family History   Problem Relation Age of Onset    Diabetes Father     Thyroid Disease Sister     Irritable Bowel Syndrome Sister     Thyroid Disease Brother     Breast Cancer Maternal Grandmother        Social History     Socioeconomic History    Marital status:      Spouse name: Crow    Number of children: Not on file    Years of education: Not on file    Highest education level: Not on file   Occupational History     Comment: non profit organization   Tobacco Use    Smoking status: Never    Smokeless tobacco: Never   Vaping Use    Vaping status: Never Used   Substance and Sexual Activity    Alcohol  "use: Not Currently    Drug use: Never    Sexual activity: Yes     Partners: Male   Other Topics Concern    Not on file   Social History Narrative    Not on file     Social Drivers of Health     Financial Resource Strain: High Risk (2022)    [facility]    Financial Resource Strain     Difficulty of Paying Living Expenses: Not on file     Difficulty of Paying Living Expenses: Not on file   Food Insecurity: Not on file   Transportation Needs: Not on file   Physical Activity: Not on file   Stress: Not on file   Social Connections: Unknown (2022)    [facility]    Social Connections     Frequency of Communication with Friends and Family: Not on file   Interpersonal Safety: Not on file   Housing Stability: Not on file       ROS: Negative except as stated above.     Physical Exam  /79   Pulse 91   Ht 1.6 m (5' 3\")   BMI 42.16 kg/m    Gen: Well-appearing, NAD  HEENT: Normocephalic, atraumatic  CV:  Well perfused   Pulm: Breathing comfortably on RA   Pelvic:  Normal appearing external female genitalia. Normal hair distribution. Vagina is without lesions. Cervix small, without visible lesions, with IUD strings visible at the external os.     Procedure:   A medium Graves speculum was placed in the vagina. The anterior lip of the cervix was grasped with a ring forceps in order to better visualize both IUD strings. Pap smear was collected. A second ring forceps was used to grasp IUD strings at the os and the Paragard IUD was removed intact in its entirety. All instruments were removed from the vagina       Assessment/Plan:  Naomi Lemons is a 30 year old  female here for IUD removal.     # Desires IUD removal  # Preconception counseling   # Family hx retinoblastoma   - Planning pregnancy, famhx retinoblastoma with extensive genetic counseling in prior pregnancy   - Declines genetics referral   - IUD " removed in clinic as above   - Instructed patient to call with positive pregnancy test, plans PNC here at [facility]   - Recommend starting PNV     # Cervical cancer screening  - No hx abnormal Pap, last 2023   - Pap collected in clinic, follow up results     Return to clinic prn     Staffed with Dr. Sands.     Keiry Quick DO, PGY-3  [facility]   July 16, 2025 10:46 AM

## 2025-07-16 NOTE — NURSING NOTE
Chief Complaint   Patient presents with    IUD     removal        Patient aware.  Encounter closed.

## 2025-07-17 LAB
HPV HR 12 DNA CVX QL NAA+PROBE: NEGATIVE
HPV16 DNA CVX QL NAA+PROBE: NEGATIVE
HPV18 DNA CVX QL NAA+PROBE: NEGATIVE
HUMAN PAPILLOMA VIRUS FINAL DIAGNOSIS: NORMAL

## 2025-07-22 LAB
BKR AP ASSOCIATED HPV REPORT: NORMAL
BKR LAB AP GYN ADEQUACY: NORMAL
BKR LAB AP GYN INTERPRETATION: NORMAL
BKR LAB AP PREVIOUS ABNORMAL: NORMAL
PATH REPORT.COMMENTS IMP SPEC: NORMAL
PATH REPORT.COMMENTS IMP SPEC: NORMAL
PATH REPORT.RELEVANT HX SPEC: NORMAL

## (undated) DEVICE — GLOVE PROTEXIS BLUE W/NEU-THERA 6.5  2D73EB65

## (undated) DEVICE — CATH TRAY FOLEY 16FR BARDEX W/DRAIN BAG STATLOCK 300316A

## (undated) DEVICE — PACK C-SECTION LF PL15OTA83B

## (undated) DEVICE — SU PLAIN 2-0 CT 27" 853H

## (undated) DEVICE — SU VICRYL 3-0 CTX 36" UND J980H

## (undated) DEVICE — ESU GROUND PAD UNIVERSAL W/O CORD

## (undated) DEVICE — STRAP KNEE/BODY 31143004

## (undated) DEVICE — SU VICRYL 0 CT-1 36" J346H

## (undated) DEVICE — STOCKING SLEEVE COMPRESSION CALF LG

## (undated) DEVICE — PREP CHLORAPREP 26ML TINTED ORANGE  260815

## (undated) DEVICE — SU MONOCRYL 4-0 PS-2 18" UND Y496G

## (undated) DEVICE — SOL WATER IRRIG 1000ML BOTTLE 07139-09

## (undated) DEVICE — GLOVE ESTEEM POWDER FREE SMT 6.5  2D72PT65

## (undated) DEVICE — SOL NACL 0.9% IRRIG 1000ML BOTTLE 07138-09

## (undated) RX ORDER — FENTANYL CITRATE 50 UG/ML
INJECTION, SOLUTION INTRAMUSCULAR; INTRAVENOUS
Status: DISPENSED
Start: 2022-07-06

## (undated) RX ORDER — MORPHINE SULFATE 2 MG/ML
INJECTION, SOLUTION INTRAMUSCULAR; INTRAVENOUS
Status: DISPENSED
Start: 2022-07-06